# Patient Record
Sex: FEMALE | Race: WHITE | NOT HISPANIC OR LATINO | Employment: OTHER | URBAN - METROPOLITAN AREA
[De-identification: names, ages, dates, MRNs, and addresses within clinical notes are randomized per-mention and may not be internally consistent; named-entity substitution may affect disease eponyms.]

---

## 2017-01-19 ENCOUNTER — CONVERSION ENCOUNTER (OUTPATIENT)
Dept: MAMMOGRAPHY | Facility: HOSPITAL | Age: 62
End: 2017-01-19

## 2019-01-05 ENCOUNTER — HOSPITAL ENCOUNTER (OUTPATIENT)
Dept: URGENT CARE | Facility: CLINIC | Age: 64
Discharge: HOME | End: 2019-01-05

## 2019-02-15 ENCOUNTER — HOSPITAL ENCOUNTER (OUTPATIENT)
Dept: MRI IMAGING | Facility: HOSPITAL | Age: 64
Discharge: HOME | End: 2019-02-15
Attending: FAMILY MEDICINE

## 2019-02-15 LAB
CREAT BLD-MCNC: 0.7 MG/DL (ref 0.6–1.4)
GFR SERPLBLD BASED ON 1.73 SQ M-ARVRAT: >60 ML/MIN/{1.73_M2}

## 2019-02-16 ENCOUNTER — HOSPITAL ENCOUNTER (OUTPATIENT)
Dept: OTHER | Facility: HOSPITAL | Age: 64
Discharge: HOME | End: 2019-02-16
Attending: FAMILY MEDICINE

## 2019-02-16 LAB
CEA SERPL-MCNC: 1.6 NG/ML (ref 0–5)
CHOLEST SERPL-MCNC: 177 MG/DL (ref 107–200)
CHOLEST/HDLC SERPL: 2.5 {RATIO} (ref 3–6)
CONV HIV-1/ HIV-2: NEGATIVE
FOLATE SERPL-MCNC: >20 NG/ML (ref 4.8–20)
HDLC SERPL-MCNC: 72 MG/DL (ref 40–60)
LDLC SERPL CALC-MCNC: 84 MG/DL (ref 70–100)
T4 FREE SERPL-MCNC: 1.4 NG/DL (ref 0.9–1.8)
TRIGL SERPL-MCNC: 107 MG/DL (ref 40–150)
TSH SERPL-ACNC: 1.07 M[IU]/L (ref 0.27–4.2)
VIT B12 SERPL-MCNC: 570 PG/ML (ref 211–911)
VLDLC SERPL-MCNC: 21 MG/DL (ref 5–37)

## 2019-02-19 LAB
B BURGDOR IGG+IGM SER-ACNC: NORMAL
CANCER AG125 SERPL-ACNC: 7.6 U/ML (ref 0–38.1)

## 2019-02-20 LAB — CONV TREPONEMA PALLIDUM (RPR) WITH FTA-ABS, TP-PA REFLEXES: NON REACTIVE

## 2019-02-27 ENCOUNTER — HOSPITAL ENCOUNTER (OUTPATIENT)
Dept: CARDIOLOGY | Facility: HOSPITAL | Age: 64
Discharge: HOME | End: 2019-02-27
Attending: FAMILY MEDICINE

## 2019-03-08 ENCOUNTER — HOSPITAL ENCOUNTER (OUTPATIENT)
Dept: MAMMOGRAPHY | Facility: HOSPITAL | Age: 64
Discharge: HOME | End: 2019-03-08
Attending: FAMILY MEDICINE

## 2019-03-26 ENCOUNTER — HOSPITAL ENCOUNTER (OUTPATIENT)
Dept: URGENT CARE | Facility: CLINIC | Age: 64
Discharge: HOME | End: 2019-03-26
Attending: EMERGENCY MEDICINE

## 2019-04-27 ENCOUNTER — HOSPITAL ENCOUNTER (OUTPATIENT)
Dept: OTHER | Facility: HOSPITAL | Age: 64
Discharge: HOME | End: 2019-04-27
Attending: FAMILY MEDICINE

## 2019-04-27 LAB
ALBUMIN SERPL-MCNC: 4.2 G/DL (ref 3.5–5)
ALBUMIN/GLOB SERPL: 1.6 {RATIO} (ref 1.4–2.6)
ALP SERPL-CCNC: 84 U/L (ref 43–160)
ALT SERPL-CCNC: 18 U/L (ref 10–40)
ANION GAP SERPL CALC-SCNC: 12 MMOL/L (ref 8–19)
AST SERPL-CCNC: 21 U/L (ref 15–50)
BILIRUB SERPL-MCNC: 0.38 MG/DL (ref 0.2–1.3)
BUN SERPL-MCNC: 11 MG/DL (ref 5–25)
BUN/CREAT SERPL: 15 {RATIO} (ref 6–20)
CALCIUM SERPL-MCNC: 9.4 MG/DL (ref 8.7–10.4)
CHLORIDE SERPL-SCNC: 105 MMOL/L (ref 99–111)
CHOLEST SERPL-MCNC: 176 MG/DL (ref 107–200)
CHOLEST/HDLC SERPL: 2.3 {RATIO} (ref 3–6)
CONV CO2: 30 MMOL/L (ref 22–32)
CONV TOTAL PROTEIN: 6.8 G/DL (ref 6.3–8.2)
CREAT UR-MCNC: 0.74 MG/DL (ref 0.5–0.9)
GFR SERPLBLD BASED ON 1.73 SQ M-ARVRAT: >60 ML/MIN/{1.73_M2}
GLOBULIN UR ELPH-MCNC: 2.6 G/DL (ref 2–3.5)
GLUCOSE SERPL-MCNC: 98 MG/DL (ref 65–99)
HDLC SERPL-MCNC: 78 MG/DL (ref 40–60)
LDLC SERPL CALC-MCNC: 74 MG/DL (ref 70–100)
OSMOLALITY SERPL CALC.SUM OF ELEC: 295 MOSM/KG (ref 273–304)
POTASSIUM SERPL-SCNC: 4.4 MMOL/L (ref 3.5–5.3)
SODIUM SERPL-SCNC: 143 MMOL/L (ref 135–147)
TRIGL SERPL-MCNC: 122 MG/DL (ref 40–150)
VLDLC SERPL-MCNC: 24 MG/DL (ref 5–37)

## 2019-09-28 ENCOUNTER — HOSPITAL ENCOUNTER (OUTPATIENT)
Dept: OTHER | Facility: HOSPITAL | Age: 64
Discharge: HOME | End: 2019-09-28
Attending: FAMILY MEDICINE

## 2019-09-28 LAB
ALBUMIN SERPL-MCNC: 4.4 G/DL (ref 3.5–5)
ALBUMIN/GLOB SERPL: 1.8 {RATIO} (ref 1.4–2.6)
ALP SERPL-CCNC: 91 U/L (ref 43–160)
ALT SERPL-CCNC: 20 U/L (ref 10–40)
ANION GAP SERPL CALC-SCNC: 15 MMOL/L (ref 8–19)
AST SERPL-CCNC: 21 U/L (ref 15–50)
BILIRUB SERPL-MCNC: 0.23 MG/DL (ref 0.2–1.3)
BUN SERPL-MCNC: 13 MG/DL (ref 5–25)
BUN/CREAT SERPL: 18 {RATIO} (ref 6–20)
CALCIUM SERPL-MCNC: 9.4 MG/DL (ref 8.7–10.4)
CHLORIDE SERPL-SCNC: 106 MMOL/L (ref 99–111)
CONV CO2: 27 MMOL/L (ref 22–32)
CONV TOTAL PROTEIN: 6.8 G/DL (ref 6.3–8.2)
CREAT UR-MCNC: 0.72 MG/DL (ref 0.5–0.9)
GFR SERPLBLD BASED ON 1.73 SQ M-ARVRAT: >60 ML/MIN/{1.73_M2}
GLOBULIN UR ELPH-MCNC: 2.4 G/DL (ref 2–3.5)
GLUCOSE SERPL-MCNC: 100 MG/DL (ref 65–99)
OSMOLALITY SERPL CALC.SUM OF ELEC: 298 MOSM/KG (ref 273–304)
POTASSIUM SERPL-SCNC: 4.3 MMOL/L (ref 3.5–5.3)
SODIUM SERPL-SCNC: 144 MMOL/L (ref 135–147)

## 2019-12-30 ENCOUNTER — HOSPITAL ENCOUNTER (OUTPATIENT)
Dept: OTHER | Facility: HOSPITAL | Age: 64
Discharge: HOME | End: 2019-12-30
Attending: FAMILY MEDICINE

## 2019-12-30 LAB
ALBUMIN SERPL-MCNC: 4 G/DL (ref 3.5–5)
ALBUMIN/GLOB SERPL: 1.7 {RATIO} (ref 1.4–2.6)
ALP SERPL-CCNC: 79 U/L (ref 43–160)
ALT SERPL-CCNC: 16 U/L (ref 10–40)
ANION GAP SERPL CALC-SCNC: 14 MMOL/L (ref 8–19)
AST SERPL-CCNC: 19 U/L (ref 15–50)
BILIRUB SERPL-MCNC: 0.2 MG/DL (ref 0.2–1.3)
BUN SERPL-MCNC: 15 MG/DL (ref 5–25)
BUN/CREAT SERPL: 21 {RATIO} (ref 6–20)
CALCIUM SERPL-MCNC: 9.4 MG/DL (ref 8.7–10.4)
CHLORIDE SERPL-SCNC: 105 MMOL/L (ref 99–111)
CONV CO2: 28 MMOL/L (ref 22–32)
CONV TOTAL PROTEIN: 6.4 G/DL (ref 6.3–8.2)
CREAT UR-MCNC: 0.71 MG/DL (ref 0.5–0.9)
GFR SERPLBLD BASED ON 1.73 SQ M-ARVRAT: >60 ML/MIN/{1.73_M2}
GLOBULIN UR ELPH-MCNC: 2.4 G/DL (ref 2–3.5)
GLUCOSE SERPL-MCNC: 90 MG/DL (ref 65–99)
OSMOLALITY SERPL CALC.SUM OF ELEC: 296 MOSM/KG (ref 273–304)
POTASSIUM SERPL-SCNC: 4.3 MMOL/L (ref 3.5–5.3)
SODIUM SERPL-SCNC: 143 MMOL/L (ref 135–147)
T4 FREE SERPL-MCNC: 1.3 NG/DL (ref 0.9–1.8)
TSH SERPL-ACNC: 1.23 M[IU]/L (ref 0.27–4.2)

## 2020-02-06 ENCOUNTER — HOSPITAL ENCOUNTER (OUTPATIENT)
Dept: URGENT CARE | Facility: CLINIC | Age: 65
Discharge: HOME | End: 2020-02-06

## 2020-02-08 LAB — BACTERIA SPEC AEROBE CULT: NORMAL

## 2020-05-22 ENCOUNTER — HOSPITAL ENCOUNTER (OUTPATIENT)
Dept: LAB | Facility: HOSPITAL | Age: 65
Discharge: HOME | End: 2020-05-22
Attending: FAMILY MEDICINE

## 2020-05-22 LAB
ALBUMIN SERPL-MCNC: 4.5 G/DL (ref 3.5–5)
ALBUMIN/GLOB SERPL: 1.7 {RATIO} (ref 1.4–2.6)
ALP SERPL-CCNC: 85 U/L (ref 43–160)
ALT SERPL-CCNC: 21 U/L (ref 10–40)
ANION GAP SERPL CALC-SCNC: 16 MMOL/L (ref 8–19)
AST SERPL-CCNC: 22 U/L (ref 15–50)
BILIRUB SERPL-MCNC: 0.24 MG/DL (ref 0.2–1.3)
BUN SERPL-MCNC: 21 MG/DL (ref 5–25)
BUN/CREAT SERPL: 26 {RATIO} (ref 6–20)
CALCIUM SERPL-MCNC: 9.9 MG/DL (ref 8.7–10.4)
CHLORIDE SERPL-SCNC: 102 MMOL/L (ref 99–111)
CHOLEST SERPL-MCNC: 209 MG/DL (ref 107–200)
CHOLEST/HDLC SERPL: 3.2 {RATIO} (ref 3–6)
CONV CO2: 27 MMOL/L (ref 22–32)
CONV TOTAL PROTEIN: 7.1 G/DL (ref 6.3–8.2)
CREAT UR-MCNC: 0.8 MG/DL (ref 0.5–0.9)
GFR SERPLBLD BASED ON 1.73 SQ M-ARVRAT: >60 ML/MIN/{1.73_M2}
GLOBULIN UR ELPH-MCNC: 2.6 G/DL (ref 2–3.5)
GLUCOSE SERPL-MCNC: 94 MG/DL (ref 65–99)
HDLC SERPL-MCNC: 65 MG/DL (ref 40–60)
LDLC SERPL CALC-MCNC: 117 MG/DL (ref 70–100)
OSMOLALITY SERPL CALC.SUM OF ELEC: 295 MOSM/KG (ref 273–304)
POTASSIUM SERPL-SCNC: 4.2 MMOL/L (ref 3.5–5.3)
SODIUM SERPL-SCNC: 141 MMOL/L (ref 135–147)
TRIGL SERPL-MCNC: 135 MG/DL (ref 40–150)
VLDLC SERPL-MCNC: 27 MG/DL (ref 5–37)

## 2020-06-05 ENCOUNTER — OFFICE VISIT CONVERTED (OUTPATIENT)
Dept: ORTHOPEDIC SURGERY | Facility: CLINIC | Age: 65
End: 2020-06-05
Attending: ORTHOPAEDIC SURGERY

## 2020-06-20 ENCOUNTER — HOSPITAL ENCOUNTER (OUTPATIENT)
Dept: MRI IMAGING | Facility: HOSPITAL | Age: 65
Discharge: HOME | End: 2020-06-20
Attending: ORTHOPAEDIC SURGERY

## 2020-06-24 ENCOUNTER — OFFICE VISIT CONVERTED (OUTPATIENT)
Dept: ORTHOPEDIC SURGERY | Facility: CLINIC | Age: 65
End: 2020-06-24
Attending: ORTHOPAEDIC SURGERY

## 2020-06-26 ENCOUNTER — HOSPITAL ENCOUNTER (OUTPATIENT)
Dept: OTHER | Facility: HOSPITAL | Age: 65
Setting detail: RECURRING SERIES
Discharge: HOME | End: 2020-10-01
Attending: ORTHOPAEDIC SURGERY

## 2020-07-29 ENCOUNTER — OFFICE VISIT CONVERTED (OUTPATIENT)
Dept: ORTHOPEDIC SURGERY | Facility: CLINIC | Age: 65
End: 2020-07-29
Attending: PHYSICIAN ASSISTANT

## 2020-09-04 ENCOUNTER — HOSPITAL ENCOUNTER (OUTPATIENT)
Dept: URGENT CARE | Facility: CLINIC | Age: 65
Discharge: HOME | End: 2020-09-04

## 2020-09-19 ENCOUNTER — HOSPITAL ENCOUNTER (OUTPATIENT)
Dept: OTHER | Facility: HOSPITAL | Age: 65
Discharge: HOME | End: 2020-09-19
Attending: FAMILY MEDICINE

## 2020-09-19 LAB
ALBUMIN SERPL-MCNC: 4.4 G/DL (ref 3.5–5)
ALBUMIN/GLOB SERPL: 1.8 {RATIO} (ref 1.4–2.6)
ALP SERPL-CCNC: 82 U/L (ref 43–160)
ALT SERPL-CCNC: 18 U/L (ref 10–40)
ANION GAP SERPL CALC-SCNC: 13 MMOL/L (ref 8–19)
AST SERPL-CCNC: 23 U/L (ref 15–50)
BILIRUB SERPL-MCNC: 0.23 MG/DL (ref 0.2–1.3)
BUN SERPL-MCNC: 19 MG/DL (ref 5–25)
BUN/CREAT SERPL: 26 {RATIO} (ref 6–20)
CALCIUM SERPL-MCNC: 9.7 MG/DL (ref 8.7–10.4)
CHLORIDE SERPL-SCNC: 103 MMOL/L (ref 99–111)
CONV CO2: 30 MMOL/L (ref 22–32)
CONV TOTAL PROTEIN: 6.9 G/DL (ref 6.3–8.2)
CREAT UR-MCNC: 0.74 MG/DL (ref 0.5–0.9)
GFR SERPLBLD BASED ON 1.73 SQ M-ARVRAT: >60 ML/MIN/{1.73_M2}
GLOBULIN UR ELPH-MCNC: 2.5 G/DL (ref 2–3.5)
GLUCOSE SERPL-MCNC: 102 MG/DL (ref 65–99)
OSMOLALITY SERPL CALC.SUM OF ELEC: 296 MOSM/KG (ref 273–304)
POTASSIUM SERPL-SCNC: 4.1 MMOL/L (ref 3.5–5.3)
SODIUM SERPL-SCNC: 142 MMOL/L (ref 135–147)

## 2020-11-08 ENCOUNTER — HOSPITAL ENCOUNTER (OUTPATIENT)
Dept: URGENT CARE | Facility: CLINIC | Age: 65
Discharge: HOME | End: 2020-11-08

## 2020-11-10 LAB — BACTERIA UR CULT: NORMAL

## 2020-11-16 ENCOUNTER — OFFICE VISIT CONVERTED (OUTPATIENT)
Dept: GASTROENTEROLOGY | Facility: CLINIC | Age: 65
End: 2020-11-16
Attending: NURSE PRACTITIONER

## 2020-12-24 ENCOUNTER — HOSPITAL ENCOUNTER (OUTPATIENT)
Dept: PREADMISSION TESTING | Facility: HOSPITAL | Age: 65
Discharge: HOME | End: 2020-12-24
Attending: INTERNAL MEDICINE

## 2020-12-26 LAB — SARS-COV-2 RNA SPEC QL NAA+PROBE: NOT DETECTED

## 2020-12-30 ENCOUNTER — HOSPITAL ENCOUNTER (OUTPATIENT)
Dept: GASTROENTEROLOGY | Facility: HOSPITAL | Age: 65
Setting detail: HOSPITAL OUTPATIENT SURGERY
Discharge: HOME | End: 2020-12-30
Attending: INTERNAL MEDICINE

## 2021-01-16 ENCOUNTER — HOSPITAL ENCOUNTER (OUTPATIENT)
Dept: OTHER | Facility: HOSPITAL | Age: 66
Discharge: HOME | End: 2021-01-16
Attending: FAMILY MEDICINE

## 2021-01-16 LAB
ALBUMIN SERPL-MCNC: 4.3 G/DL (ref 3.5–5)
ALBUMIN/GLOB SERPL: 1.6 {RATIO} (ref 1.4–2.6)
ALP SERPL-CCNC: 90 U/L (ref 43–160)
ALT SERPL-CCNC: 17 U/L (ref 10–40)
ANION GAP SERPL CALC-SCNC: 13 MMOL/L (ref 8–19)
AST SERPL-CCNC: 20 U/L (ref 15–50)
BILIRUB SERPL-MCNC: 0.24 MG/DL (ref 0.2–1.3)
BUN SERPL-MCNC: 17 MG/DL (ref 5–25)
BUN/CREAT SERPL: 24 {RATIO} (ref 6–20)
CALCIUM SERPL-MCNC: 10 MG/DL (ref 8.7–10.4)
CHLORIDE SERPL-SCNC: 104 MMOL/L (ref 99–111)
CHOLEST SERPL-MCNC: 195 MG/DL (ref 107–200)
CHOLEST/HDLC SERPL: 2.7 {RATIO} (ref 3–6)
CONV CO2: 31 MMOL/L (ref 22–32)
CONV TOTAL PROTEIN: 7 G/DL (ref 6.3–8.2)
CREAT UR-MCNC: 0.7 MG/DL (ref 0.5–0.9)
GFR SERPLBLD BASED ON 1.73 SQ M-ARVRAT: >60 ML/MIN/{1.73_M2}
GLOBULIN UR ELPH-MCNC: 2.7 G/DL (ref 2–3.5)
GLUCOSE SERPL-MCNC: 97 MG/DL (ref 65–99)
HDLC SERPL-MCNC: 71 MG/DL (ref 40–60)
LDLC SERPL CALC-MCNC: 98 MG/DL (ref 70–100)
OSMOLALITY SERPL CALC.SUM OF ELEC: 297 MOSM/KG (ref 273–304)
POTASSIUM SERPL-SCNC: 4.5 MMOL/L (ref 3.5–5.3)
SODIUM SERPL-SCNC: 143 MMOL/L (ref 135–147)
T4 FREE SERPL-MCNC: 1.4 NG/DL (ref 0.9–1.8)
TRIGL SERPL-MCNC: 129 MG/DL (ref 40–150)
TSH SERPL-ACNC: 1.04 M[IU]/L (ref 0.27–4.2)
VLDLC SERPL-MCNC: 26 MG/DL (ref 5–37)

## 2021-03-01 ENCOUNTER — OFFICE VISIT CONVERTED (OUTPATIENT)
Dept: GASTROENTEROLOGY | Facility: CLINIC | Age: 66
End: 2021-03-01
Attending: NURSE PRACTITIONER

## 2021-05-10 NOTE — H&P
"   History and Physical      Patient Name: Mony Echeverria   Patient ID: [id number]   Sex: Female   YOB: 1955    Primary Care Provider: Dilma Carrington MD   Referring Provider: Dilma Carrington MD    Visit Date: November 16, 2020    Provider: RIAN Bruce   [facility]   Location Address: [address] [place] [address]  [id number]   Location Phone: [phone]          Chief Complaint  · \"Age over 50\"      History Of Present Illness  The patient is a 64 year old /White female, who presents on referral from Dilma Carrington MD, for gastroenterology evaluation of screening colonoscopy for her risk factors for colon cancer. Her risk factors include an age greater than 50 years.   There is no recent history of rectal bleeding. She has no pertinent additional complaints      She has no family history of colon cancer.  She denies rectal bleeding.  Colonoscopy 10/2015 by Dr. Bolden revealed a tubular adenoma removed from ascending colon and grade I internal hemorrhoids.    She complains of having acid reflux for several years.  It is well controlled with famotidine 20 mg BID.  She has had several episodes of dysphagia that occurs with solids and liquids.  There is nothing that precipitates the dysphagia.  Nothing but time helps with the dysphagia.  She does report having a lot of drainage.  She denies N/V and abdominal pain.       Past Medical History  Asthma; Hematuria, microscopic; HTN (hypertension); Hypertension; Hypothyroidism; Reflux; Renal calculus or stone; Seasonal allergies         Past Surgical History  Appendectomy; Breast biopsy, right breast; Cataract surgery; Cesarian Section; Cholecystectomy; Colon Surgery; Colonoscopy; EGD; Hysterectomy; Kidney Stone Surgery, Unspecified; Surgical Clips         Medication List  Allergy Serum; Asmanex Twisthaler inhalation 0.24 gram; aspirin 81 mg oral tablet,delayed release (/EC); Claritin 10 mg " "oral tablet; EpiPen 0.3 mg/0.3 mL (1:1,000) injection auto-injector; famotidine 20 mg oral tablet; levothyroxine 125 mcg oral tablet; lisinopril 10 mg oral tablet; multivitamin oral tablet; pravastatin 40 mg oral tablet; Singulair 10 mg oral tablet         Allergy List  * Other; Keflex; Latex; SULFA (SULFONAMIDES)       Allergies Reconciled  Family Medical History  Bipolar Disorder; Stroke; Heart Disease; Cancer, Unspecified; Diabetes, unspecified type; Hypertension; - No Family History of Colorectal Cancer; Diabetes Mellitus, Type II; Bladder Cancer         Social History  Alcohol (Current some day); Claustophobic (Unknown); lives with spouse; ; Recreational Drug Use (Never); Tobacco (Former); Working         Review of Systems  · Constitutional  o Denies  o : chills, fever  · Eyes  o Denies  o : blurred vision, changes in vision  · Cardiovascular  o Denies  o : chest pain  · Respiratory  o Denies  o : shortness of breath  · Gastrointestinal  o Denies  o : nausea, vomiting  · Genitourinary  o Denies  o : dysuria, blood in urine  · Integument  o Denies  o : rash  · Neurologic  o Denies  o : tingling or numbness  · Musculoskeletal  o Denies  o : joint pain  · Endocrine  o Denies  o : weight gain, weight loss  · Psychiatric  o Denies  o : anxiety, depression      Vitals  Date Time BP Position Site L\R Cuff Size HR RR TEMP (F) WT  HT  BMI kg/m2 BSA m2 O2 Sat FR L/min FiO2        11/16/2020 08:18 /72 Sitting    69 - R   181lbs 4oz 5'  7\" 28.39 1.97 99 %            Physical Examination  · Constitutional  o Appearance  o : Well-nourished, well developed, alert, in no acute distress, alert and oriented X 3.  · Eyes  o Vision  o :   § Visual Fields  § : eyes move symmetrical in all directions  o Sclerae  o : sclerae anicteric  o Pupils and Irises  o : pupils equal and symetrical  · Neck  o Inspection/Palpation  o : Trachea is midline, no adenopathy  o Thyroid  o : Thyroid is not " enlarged  · Respiratory  o Respiratory Effort  o : Breathing is unlabored.  o Inspection of Chest  o : normal appearance, no retractions  o Auscultation of Lungs  o : Chest is clear to auscultation bilaterally.  · Cardiovascular  o Heart  o :   § Auscultation of Heart  § : no murmurs, rubs, or gallops  · Gastrointestinal  o Abdominal Examination  o : Abdomen is soft, nontender to palpation, with normal active bowel sounds, no appreciable hepatosplenomegaly.  · Genitourinary  o Digital Rectal Examination  o : Deferred  · Skin and Subcutaneous Tissue  o General Inspection  o : Skin is without focal lesions. Skin turgor is normal.  · Psychiatric  o Mood and Affect  o : Mood and affect are appropriate to circumstances.          Assessment  · Colon Cancer Screening     V76.51/Z12.11  · Colon polyp     211.3/K63.5  · Dysphagia     787.20/R13.10  · Gastroesophageal reflux     530.81/K21.9      Plan  · Orders  o Consent for Colonoscopy Screening -Possible risk/complications, benefits, and alternatives to surgical or invasive procedure have been explained to patient and/or legal gaurdian. -Patient has been evaluated and can tolerate anethesia and/or sedation. Risk, benefits, and alternatives to anesthesia and sedation have been explained to patient or legal gaurdian. () - V76.51/Z12.11, 211.3/K63.5 - 11/16/2020  o Consent for Esophagogastroduodenoscopy (EGD) - Possible risks/complications, benefits, and alternatives to surgical or invasive procedure have been explained to patient and/or legal guardian. - Patient has been evaluated and can tolerate anesthesia and/or sedation. Risks, benefits, and alternatives to anesthesia and sedation have been explained to patient and/or legal guardian. ([id number]) - 787.20/R13.10, 530.81/K21.9 - 11/16/2020  · Medications  o Medications have been Reconciled  o Transition of Care or Provider Policy  · Instructions  o 64 year old female presenting today for a colorectal cancer screening. She  also has acid reflux and intermittent dysphagia. I have recommended that she undergo and EGD/colonoscopy. The patient has been notified that COVID testing is required prior to the procedure. The patient is agreeable to the plan.  o Electronically Identified Patient Education Materials Provided Electronically            Electronically Signed by: RIAN Bruce -Author on November 16, 2020 08:32:38 AM  Electronically Co-signed by: Rene Bolden MD -Reviewer on November 23, 2020 02:58:23 PM

## 2021-05-10 NOTE — H&P
History and Physical      Patient Name: Mony Echeverria   Patient ID: [id number]   Sex: Female   YOB: 1955    Primary Care Provider: Dilma Carrington MD   Referring Provider: Dilma Carrington MD    Visit Date: June 5, 2020    Provider: Sampson Moore MD   Location: [facility]   Location Address: [address] [place] [address]  [id number]   Location Phone: [phone]          Chief Complaint  · Right shoulder pain      History Of Present Illness  Mony Echeverria is a 64 year old /White female who presents today to [facility]. Patient is here for right shoulder injury sustained in March when she had a fall. Patient states pain and weakness in the right shoulder that radiates from the right shoulder down to her elbow. Patient states pain with range of motion. Patient denies numbness or tingling.       Past Medical History  Asthma; Hematuria, microscopic; HTN (hypertension); Hypertension; Hypothyroidism; Renal calculus or stone; Seasonal allergies; Thyroid disorder         Past Surgical History  Appendectomy; Breast biopsy, right breast; Cataract surgery; Cesarian Section; Cholecystectomy; Colon Surgery; Colonoscopy; EGD; Gallbladder; Hysterectomy; Kidney Stone Surgery, Unspecified; Surgical Clips         Medication List  Allergy Serum; Asmanex Twisthaler inhalation 0.24 gram; EpiPen 0.3 mg/0.3 mL (1:1,000) injection auto-injector; fluticasone 50 mcg/actuation nasal spray,suspension; lisinopril 10 mg oral tablet; multivitamin oral tablet; Pravachol 40 mg oral tablet; prednisone 20 mg oral tablet; Prilosec OTC 20 mg oral tablet,delayed release (DR/EC); Singulair 10 mg oral tablet; Suprep Bowel Prep Kit 17.5-3.13-1.6 gram oral recon soln; Synthroid 125 mcg oral tablet; Ventolin HFA 90 mcg/actuation inhalation HFA aerosol inhaler         Allergy List  * Other; Keflex; Latex; Latex Exam Gloves; SULFA (SULFONAMIDES)         Family Medical History  Bipolar Disorder; Stroke;  "Heart Disease; Cancer, Unspecified; Diabetes, unspecified type; Hypertension; - No Family History of Colorectal Cancer; Diabetes mellitus, type II; Bladder Cancer         Social History  Alcohol (Current some day); Alcohol Use (Current some day); Claustophobic (Unknown); lives with spouse; ; .; Recreational Drug Use (Never); Tobacco (Former); Working         Review of Systems  · Constitutional  o Denies  o : fever, chills, weight loss  · Cardiovascular  o Denies  o : chest pain, shortness of breath  · Gastrointestinal  o Denies  o : liver disease, heartburn, nausea, blood in stools  · Genitourinary  o Denies  o : painful urination, blood in urine  · Integument  o Denies  o : rash, itching  · Neurologic  o Denies  o : headache, weakness, loss of consciousness  · Musculoskeletal  o Denies  o : painful, swollen joints  · Psychiatric  o Denies  o : drug/alcohol addiction, anxiety, depression      Vitals  Date Time BP Position Site L\R Cuff Size HR RR TEMP (F) WT  HT  BMI kg/m2 BSA m2 O2 Sat        06/05/2020 02:34 PM         173lbs 0oz 5'  7\" 27.1 1.93           Physical Examination  · Constitutional  o Appearance  o : well developed, well-nourished, no obvious deformities present  · Head and Face  o Head  o :   § Inspection  § : normocephalic  o Face  o :   § Inspection  § : no facial lesions  · Eyes  o Conjunctivae  o : conjunctivae normal  o Sclerae  o : sclerae white  · Ears, Nose, Mouth and Throat  o Ears  o :   § External Ears  § : appearance within normal limits  § Hearing  § : intact  o Nose  o :   § External Nose  § : appearance normal  · Neck  o Inspection/Palpation  o : normal appearance  o Range of Motion  o : full range of motion  · Respiratory  o Respiratory Effort  o : breathing unlabored  o Inspection of Chest  o : normal appearance  o Auscultation of Lungs  o : no audible wheezing or rales  · Cardiovascular  o Heart  o : regular rate  · Gastrointestinal  o Abdominal Examination  o : soft " and non-tender  · Skin and Subcutaneous Tissue  o General Inspection  o : intact, no rashes  · Psychiatric  o General  o : Alert and oriented x3  o Judgement and Insight  o : judgment and insight intact  o Mood and Affect  o : mood normal, affect appropriate  · Right Shoulder  o Inspection  o : No skin discoloration, atrophy or swelling. Palpable tenderness over the AC joint. Palpable tenderness over greater tuberosity. She has passive range of motion to 130. Abduction to 90. Internal rotation to L4. Positive empty can. Positive Neers. Positive Salazar. X-rays show superior rising humeral head. AC joint osteoarthritis. Negative for fracture or dislocation.   · In Office Procedures  o View  o : AP/LATERAL  o Site  o : right shoulder  o Indication  o : right shoulder pain  o Study  o : X-rays ordered, taken in the office, and reviewed today.  o Xray  o : X-rays show superior rising humeral head. AC joint osteoarthritis. Negative for fracture or dislocation.   o Comparative Data  o : No comparative data found              Assessment  · Right shoulder pain, unspecified chronicity     719.41/M25.511  · Right shoulder injury     959.2/S49.90XA      Plan  · Orders  o [place] (Right) [facility] ([id number]) - 719.41/M25.511 - 06/05/2020  · Medications  o Medications have been Reconciled  o Transition of Care or Provider Policy  · Instructions  o Reviewed the patient's Past Medical, Social, and Family history as well as the ROS at today's visit, no changes.  o Call or return if worsening symptoms.  o This note is transcribed by Krysta petty/gifty  o Right shoulder MRI. Follow-up after the MRI.             Electronically Signed by: Krytsa Hou, -Author on June 8, 2020 11:44:00 AM  Electronically Co-signed by: Kathya Moe PA-C -Reviewer on June 8, 2020 12:44:06 PM  Electronically Co-signed by: Sampson Moore MD -Reviewer on Melinda 10, 2020 11:19:52 AM

## 2021-05-13 NOTE — PROGRESS NOTES
Progress Note      Patient Name: Mony Echeverria   Patient ID: [id number]   Sex: Female   YOB: 1955    Primary Care Provider: Dilma Carrington MD   Referring Provider: Dilma Carrington MD    Visit Date: June 24, 2020    Provider: Sampson Moore MD   Location: [facility]   Location Address: [address] [place] [address]  [id number]   Location Phone: [phone]          Chief Complaint  · Right Shoulder Pain - MRI Results      History Of Present Illness  Mony Echeverria is a 64 year old /White female who presents today to [facility].      Patient reports a fall in March landing directly on the shoulder and has been having radiating pain since. She reports the pain is from the shoulder to the elbow. She was seen recently with an MRI with [facility]. Patient reports her pain is some better and the shoulder movement has improved some as well.       Past Medical History  Asthma; Hematuria, microscopic; HTN (hypertension); Hypertension; Hypothyroidism; Renal calculus or stone; Seasonal allergies; Thyroid disorder         Past Surgical History  Appendectomy; Breast biopsy, right breast; Cataract surgery; Cesarian Section; Cholecystectomy; Colon Surgery; Colonoscopy; EGD; Gallbladder; Hysterectomy; Kidney Stone Surgery, Unspecified; Surgical Clips         Medication List  Allergy Serum; Asmanex Twisthaler inhalation 0.24 gram; EpiPen 0.3 mg/0.3 mL (1:1,000) injection auto-injector; fluticasone 50 mcg/actuation nasal spray,suspension; lisinopril 10 mg oral tablet; multivitamin oral tablet; Pravachol 40 mg oral tablet; prednisone 20 mg oral tablet; Prilosec OTC 20 mg oral tablet,delayed release (DR/EC); Singulair 10 mg oral tablet; Suprep Bowel Prep Kit 17.5-3.13-1.6 gram oral recon soln; Synthroid 125 mcg oral tablet; Ventolin HFA 90 mcg/actuation inhalation HFA aerosol inhaler         Allergy List  * Other; Keflex; Latex; Latex Exam Gloves; SULFA (SULFONAMIDES)         Family  "Medical History  Bipolar Disorder; Stroke; Heart Disease; Cancer, Unspecified; Diabetes, unspecified type; Hypertension; - No Family History of Colorectal Cancer; Diabetes Mellitus, Type II; Bladder Cancer         Social History  Alcohol (Current some day); Alcohol Use (Current some day); Claustophobic (Unknown); lives with spouse; ; .; Recreational Drug Use (Never); Tobacco (Former); Working         Review of Systems  · Constitutional  o Denies  o : fever, chills, weight loss  · Cardiovascular  o Denies  o : chest pain, shortness of breath  · Gastrointestinal  o Denies  o : liver disease, heartburn, nausea, blood in stools  · Genitourinary  o Denies  o : painful urination, blood in urine  · Integument  o Denies  o : rash, itching  · Neurologic  o Denies  o : headache, weakness, loss of consciousness  · Musculoskeletal  o Denies  o : painful, swollen joints  · Psychiatric  o Denies  o : drug/alcohol addiction, anxiety, depression      Vitals  Date Time BP Position Site L\R Cuff Size HR RR TEMP (F) WT  HT  BMI kg/m2 BSA m2 O2 Sat        06/24/2020 09:20 AM      73 - R   174lbs 16oz 5'  7\" 27.41 1.94 98 %          Physical Examination  · Constitutional  o Appearance  o : well developed, well-nourished, no obvious deformities present  · Head and Face  o Head  o :   § Inspection  § : normocephalic  o Face  o :   § Inspection  § : no facial lesions  · Eyes  o Conjunctivae  o : conjunctivae normal  o Sclerae  o : sclerae white  · Ears, Nose, Mouth and Throat  o Ears  o :   § External Ears  § : appearance within normal limits  § Hearing  § : intact  o Nose  o :   § External Nose  § : appearance normal  · Neck  o Inspection/Palpation  o : normal appearance  o Range of Motion  o : full range of motion  · Respiratory  o Respiratory Effort  o : breathing unlabored  o Inspection of Chest  o : normal appearance  o Auscultation of Lungs  o : no audible wheezing or rales  · Cardiovascular  o Heart  o : regular " rate  · Gastrointestinal  o Abdominal Examination  o : soft and non-tender  · Skin and Subcutaneous Tissue  o General Inspection  o : intact, no rashes  · Psychiatric  o General  o : Alert and oriented x3  o Judgement and Insight  o : judgment and insight intact  o Mood and Affect  o : mood normal, affect appropriate  · Right Shoulder  o Inspection  o : 80 degrees forward flexion, No skin discoloration, atrophy or swelling. Palpable tenderness over the AC joint. Palpable tenderness over greater tuberosity. Abduction to 90. Internal rotation to L4. Positive empty can. Positive Neers. Positive Salazar. Good tone of deltoid, biceps, triceps, wrist extensors, and wrist flexors  · Injection Note/Aspiration Note  o Site  o : right shoulder  o Procedure  o : Procedure: After educating the patient, patient gave consent for procedure. After using Chloraprep, the joint space was injected. The patient tolerated the procedure well.   o Medication  o : 80 mg of DepoMedrol with 9cc of 1% Lidocaine  · Imaging  o Imaging  o : MRI 6.20.20 with [facility]: tearing of the anterior inferior glenohumeral shoulder joint capsule ligament. Stress fracture verses focal red marrow surrounded by fatty marrow in the proximal shaft-metaphysis right humerus. Partial tearing and Tendinopathy in the critical zone and insertion of the supraspinatus tendon of the right rotator cuff          Assessment  · Right shoulder pain, unspecified chronicity     719.41/M25.511  · Adhesive capsulitis     726.0/M75.00      Plan  · Orders  o Depo-Medrol injection 80mg () - - 06/24/2020   Lot [id number] Exp 06 2021 [employer] Administered by AMY Moore MD  o Shoulder Intra-articular Injection without US Guidance [facility] ([id number]) - - 06/24/2020   Lot [id number] Exp 07 01 2021 [employer] Administered by AMY Moore MD  · Medications  o Medications have been Reconciled  o Transition of Care or Provider Policy  · Instructions  o Reviewed the patient's Past Medical, Social, and  Family history as well as the ROS at today's visit, no changes.  o Call or return if worsening symptoms.  o Follow up in 6 weeks.  o The above service was scribed by Aster Lamb on my behalf and I attest to the accuracy of the note. gifty  o Discussed diagnosis and treatment options. We reviewed her MRI which revealed no RTC tear and discussed from the fall and protecting the shoulder she now has adhesive capsulitis. We discussed if she cannot gain full ROM we may consider a shoulder manipulation. We recommended a steroid injection and physical therapy. She expressed understanding and wished to proceed with an injection and therapy.             Electronically Signed by: Aster Lamb-, Other -Author on June 26, 2020 09:56:56 PM  Electronically Co-signed by: Sampson Moore MD -Reviewer on June 29, 2020 09:33:52 AM

## 2021-05-13 NOTE — PROGRESS NOTES
Progress Note      Patient Name: Mony Echeverria   Patient ID: [id number]   Sex: Female   YOB: 1955    Primary Care Provider: Dilma Carrington MD   Referring Provider: Dilma Carrington MD    Visit Date: July 29, 2020    Provider: Denisa Ayala PA-C   Location: [facility]   Location Address: [address] [place] [address]  [id number]   Location Phone: [phone]          Chief Complaint  · Right shoulder pain       History Of Present Illness  Mony Echeverria is a 64 year old /White female who presents today to [facility].      She is here for follow up for right adhesive capsulitis. She had injection last visit and is attending PT. She states great improvement in her motion, but continues to have pain, for which she takes Aleve.       Past Medical History  Asthma; Hematuria, microscopic; HTN (hypertension); Hypertension; Hypothyroidism; Renal calculus or stone; Seasonal allergies; Thyroid disorder         Past Surgical History  Appendectomy; Breast biopsy, right breast; Cataract surgery; Cesarian Section; Cholecystectomy; Colon Surgery; Colonoscopy; EGD; Gallbladder; Hysterectomy; Kidney Stone Surgery, Unspecified; Surgical Clips         Medication List  Allergy Serum; Asmanex Twisthaler inhalation 0.24 gram; EpiPen 0.3 mg/0.3 mL (1:1,000) injection auto-injector; fluticasone 50 mcg/actuation nasal spray,suspension; lisinopril 10 mg oral tablet; multivitamin oral tablet; Pravachol 40 mg oral tablet; prednisone 20 mg oral tablet; Prilosec OTC 20 mg oral tablet,delayed release (DR/EC); Singulair 10 mg oral tablet; Suprep Bowel Prep Kit 17.5-3.13-1.6 gram oral recon soln; Synthroid 125 mcg oral tablet; Ventolin HFA 90 mcg/actuation inhalation HFA aerosol inhaler         Allergy List  * Other; Keflex; Latex; Latex Exam Gloves; SULFA (SULFONAMIDES)       Allergies Reconciled  Family Medical History  Bipolar Disorder; Stroke; Heart Disease; Cancer, Unspecified; Diabetes,  "unspecified type; Hypertension; - No Family History of Colorectal Cancer; Diabetes Mellitus, Type II; Bladder Cancer         Social History  Alcohol (Current some day); Alcohol Use (Current some day); Claustophobic (Unknown); lives with spouse; ; .; Recreational Drug Use (Never); Tobacco (Former); Working         Review of Systems  · Constitutional  o Denies  o : fever, chills, weight loss  · Cardiovascular  o Denies  o : chest pain, shortness of breath  · Gastrointestinal  o Denies  o : liver disease, heartburn, nausea, blood in stools  · Genitourinary  o Denies  o : painful urination, blood in urine  · Integument  o Denies  o : rash, itching  · Neurologic  o Denies  o : headache, weakness, loss of consciousness  · Musculoskeletal  o Admits  o : painful, swollen joints  · Psychiatric  o Denies  o : drug/alcohol addiction, anxiety, depression      Vitals  Date Time BP Position Site L\R Cuff Size HR RR TEMP (F) WT  HT  BMI kg/m2 BSA m2 O2 Sat        07/29/2020 08:24 AM      78 - R   181lbs 0oz 5'  7\" 28.35 1.97 98 %          Physical Examination  · Constitutional  o Appearance  o : well developed, well-nourished, no obvious deformities present  · Head and Face  o Head  o :   § Inspection  § : normocephalic  o Face  o :   § Inspection  § : no facial lesions  · Eyes  o Conjunctivae  o : conjunctivae normal  o Sclerae  o : sclerae white  · Ears, Nose, Mouth and Throat  o Ears  o :   § External Ears  § : appearance within normal limits  § Hearing  § : intact  o Nose  o :   § External Nose  § : appearance normal  · Neck  o Inspection/Palpation  o : normal appearance  o Range of Motion  o : full range of motion  · Respiratory  o Respiratory Effort  o : breathing unlabored  o Inspection of Chest  o : normal appearance  o Auscultation of Lungs  o : no audible wheezing or rales  · Cardiovascular  o Heart  o : regular rate  · Gastrointestinal  o Abdominal Examination  o : soft and non-tender  · Skin and " Subcutaneous Tissue  o General Inspection  o : intact, no rashes  · Psychiatric  o General  o : Alert and oriented x3  o Judgement and Insight  o : judgment and insight intact  o Mood and Affect  o : mood normal, affect appropriate  · Right Shoulder  o Inspection  o : 150 degrees forward flexion, 120 degrees abduction. ER 60 degrees. IR to L5. No skin discoloration, atrophy or swelling. Palpable tenderness over the AC joint. Palpable tenderness over greater tuberosity. Good tone of deltoid, biceps, triceps, wrist extensors, and wrist flexors   · Imaging  o Imaging  o : MRI 6.20.20 with [facility]: tearing of the anterior inferior glenohumeral shoulder joint capsule ligament. Stress fracture verses focal red marrow surrounded by fatty marrow in the proximal shaft-metaphysis right humerus. Partial tearing and Tendinopathy in the critical zone and insertion of the supraspinatus tendon of the right rotator cuff           Assessment  · Right shoulder pain, unspecified chronicity     719.41/M25.511  · Adhesive capsulitis     726.0/M75.00      Plan  · Medications  o Medications have been Reconciled  o Transition of Care or Provider Policy  · Instructions  o Reviewed the patient's Past Medical, Social, and Family history as well as the ROS at today's visit, no changes.  o Call or return if worsening symptoms.  o I believe she has avoided a manipulation. She will continue PT and try to wean off Aleve. Follow up PRN.  o Electronically Identified Patient Education Materials Provided Electronically            Electronically Signed by: EMILIO López-C -Author on July 29, 2020 08:40:14 AM  Electronically Co-signed by: Sampson Moore MD -Reviewer on July 29, 2020 08:55:19 AM

## 2021-05-14 VITALS
DIASTOLIC BLOOD PRESSURE: 73 MMHG | BODY MASS INDEX: 28.25 KG/M2 | RESPIRATION RATE: 16 BRPM | WEIGHT: 180 LBS | HEIGHT: 67 IN | SYSTOLIC BLOOD PRESSURE: 138 MMHG | OXYGEN SATURATION: 99 % | HEART RATE: 70 BPM

## 2021-05-14 VITALS
OXYGEN SATURATION: 99 % | HEART RATE: 69 BPM | SYSTOLIC BLOOD PRESSURE: 135 MMHG | HEIGHT: 67 IN | BODY MASS INDEX: 28.45 KG/M2 | DIASTOLIC BLOOD PRESSURE: 72 MMHG | WEIGHT: 181.25 LBS

## 2021-05-14 NOTE — PROGRESS NOTES
Progress Note      Patient Name: Mony Echeverria   Patient ID: [id number]   Sex: Female   YOB: 1955    Primary Care Provider: Dilma Carrington MD   Referring Provider: Dilma Carrington MD    Visit Date: March 1, 2021    Provider: RIAN LUQUE   Location: [facility]   Location Address: [address] [place] [address]  [id number]   Location Phone: [phone]          Chief Complaint  · Follow up of EGD/Colonoscopy      History Of Present Illness     65-year-old female with a history of reflux and dysphagia.  Presents to the office today for a follow-up after undergoing EGD and colonoscopy.  Patient had a balloon dilation up to 18 mm during EGD back in December.  Patient reports that her dysphagia has completely resolved since dilation.  Patient is having no issues swallowing food, solids, liquids, pills.  Patient reports her reflux is very well controlled on Pepcid twice a day 20 mg.  Denies nausea vomiting bloody stools hematic emesis, weight loss.  Patient reports she has no concerns and just needs a refill on her famotidine.    EGD/colonoscopy: Review of the patient's most recent EGD and colonoscopy performed by Dr. Bolden 12/30/2020 revealed a medium size hiatal hernia and a benign intrinsic stricture in the GE junction with balloon dilation up to 18 mm.  Grade 1 esophagitis in the GE junction.  Normal mucosa in the stomach.  Normal mucosa was noted in the colon and grade 1 internal hemorrhoids.  Pathology reports normal.    Labs: Reviewed the patient's most recent labs 1/16/2021 normal CMP         Past Medical History  Asthma; Hematuria, microscopic; HTN (hypertension); Hypertension; Hypothyroidism; Reflux; Renal calculus or stone; Seasonal allergies         Past Surgical History  Appendectomy; Breast biopsy, right breast; Cataract surgery; Cesarian Section; Cholecystectomy; Colon Surgery; Colonoscopy; EGD; Hysterectomy; Kidney Stone Surgery, Unspecified;  "Surgical Clips         Medication List  Allergy Serum; Asmanex Twisthaler inhalation 0.24 gram; aspirin 81 mg oral tablet,delayed release (DR/EC); Claritin 10 mg oral tablet; EpiPen 0.3 mg/0.3 mL (1:1,000) injection auto-injector; famotidine 20 mg oral tablet; levothyroxine 125 mcg oral tablet; lisinopril 10 mg oral tablet; multivitamin oral tablet; pravastatin 40 mg oral tablet; Singulair 10 mg oral tablet         Allergy List  * Other; Keflex; Latex; SULFA (SULFONAMIDES)         Family Medical History  Bipolar Disorder; Stroke; Heart Disease; Cancer, Unspecified; Diabetes, unspecified type; Hypertension; - No Family History of Colorectal Cancer; Diabetes Mellitus, Type II; Bladder Cancer         Social History  Alcohol (Current some day); Claustophobic (Unknown); lives with spouse; ; Recreational Drug Use (Never); Tobacco (Former); Working         Review of Systems  · Constitutional  o Denies  o : chills, fatigue, fever, malaise, night sweats, weight gain, weight loss  · Cardiovascular  o Denies  o : chest pain  · Respiratory  o Denies  o : shortness of breath  · Gastrointestinal  o Denies  o : abdominal pain, blood in stools, bowel changes, constipation, diarrhea, early satiety, heartburn, nausea, reflux, vomiting, dysphagia  · Endocrine  o Denies  o : weight gain, weight loss      Vitals  Date Time BP Position Site L\R Cuff Size HR RR TEMP (F) WT  HT  BMI kg/m2 BSA m2 O2 Sat FR L/min FiO2        03/01/2021 09:27 /73 Sitting    70 - R 16  180lbs 0oz 5'  7\" 28.19 1.96 99 %            Physical Examination  · Constitutional  o Appearance  o : Healthy-appearing, awake and alert in no acute distress, overweight  · Head and Face  o Head  o : Normocephalic with no worriesome skin lesions  · Eyes  o Vision  o :   § Visual Fields  § : eyes move symmetrical in all directions  o Sclerae  o : sclerae anicteric  · Neck  o Inspection/Palpation  o : Trachea is midline, no adenopathy  · Respiratory  o Respiratory " Effort  o : Breathing is unlabored.  o Inspection of Chest  o : normal appearance  o Auscultation of Lungs  o : Chest is clear to auscultation bilaterally.  · Cardiovascular  o Heart  o :   § Auscultation of Heart  § : no murmurs, rubs, or gallops, regular rate and rhythm  o Peripheral Vascular System  o :   § Extremities  § : no cyanosis, clubbing or edema;   · Gastrointestinal  o Abdominal Examination  o : Abdomen is soft, nontender to palpation, with normal active bowel sounds, no appreciable hepatosplenomegaly.          Assessment  · Dysphagia     787.20/R13.10  · Gastroesophageal Reflux     530.81/K21.9      Plan  · Medications  o famotidine 20 mg oral tablet   SIG: take 1 tablet (20 mg) by oral route every 12 hours   DISP: (60) Tablet with 11 refills  Prescribed on 03/01/2021     o Medications have been Reconciled  o Transition of Care or Provider Policy  · Instructions  o 65-year-old female with a history of reflux and dysphagia presents the office for follow-up after undergoing EGD and colonoscopy. Patient had balloon dilation during her EGD and complete resolution of her dysphagia. Patient's colonoscopy looked great and was reviewed with the patient. She will have a 5-year colonoscopy and EGD due to prep and esophagitis. I will refill the patient's Pepcid 20 mg twice daily. We will follow up on an as-needed basis. Patient is doing really well. Patient to call with any questions or concerns.  · Disposition  o Call or Return if symptoms worsen or persist.  o Meds sent to pharmacy            Electronically Signed by: RIAN LUQUE -Author on March 1, 2021 09:42:00 AM  Electronically Co-signed by: Rene Bolden MD -Reviewer on March 14, 2021 07:52:20 PM

## 2021-05-15 VITALS — WEIGHT: 173 LBS | BODY MASS INDEX: 27.15 KG/M2 | HEIGHT: 67 IN

## 2021-05-15 VITALS — OXYGEN SATURATION: 98 % | HEIGHT: 67 IN | BODY MASS INDEX: 27.47 KG/M2 | WEIGHT: 175 LBS | HEART RATE: 73 BPM

## 2021-05-15 VITALS — WEIGHT: 181 LBS | HEIGHT: 67 IN | BODY MASS INDEX: 28.41 KG/M2 | OXYGEN SATURATION: 98 % | HEART RATE: 78 BPM

## 2021-06-15 ENCOUNTER — TRANSCRIBE ORDERS (OUTPATIENT)
Dept: ADMINISTRATIVE | Facility: HOSPITAL | Age: 66
End: 2021-06-15

## 2021-06-15 ENCOUNTER — LAB (OUTPATIENT)
Dept: LAB | Facility: HOSPITAL | Age: 66
End: 2021-06-15

## 2021-06-15 DIAGNOSIS — E78.5 HYPERLIPIDEMIA, UNSPECIFIED HYPERLIPIDEMIA TYPE: Primary | ICD-10-CM

## 2021-06-15 DIAGNOSIS — E78.5 HYPERLIPIDEMIA, UNSPECIFIED HYPERLIPIDEMIA TYPE: ICD-10-CM

## 2021-06-15 DIAGNOSIS — Z12.31 VISIT FOR SCREENING MAMMOGRAM: Primary | ICD-10-CM

## 2021-06-15 LAB
ALBUMIN SERPL-MCNC: 4.4 G/DL (ref 3.5–5.2)
ALBUMIN/GLOB SERPL: 1.7 G/DL
ALP SERPL-CCNC: 92 U/L (ref 39–117)
ALT SERPL W P-5'-P-CCNC: 20 U/L (ref 1–33)
ANION GAP SERPL CALCULATED.3IONS-SCNC: 6.9 MMOL/L (ref 5–15)
AST SERPL-CCNC: 21 U/L (ref 1–32)
BILIRUB SERPL-MCNC: <0.2 MG/DL (ref 0–1.2)
BUN SERPL-MCNC: 16 MG/DL (ref 8–23)
BUN/CREAT SERPL: 20.3 (ref 7–25)
CALCIUM SPEC-SCNC: 9.4 MG/DL (ref 8.6–10.5)
CHLORIDE SERPL-SCNC: 101 MMOL/L (ref 98–107)
CO2 SERPL-SCNC: 30.1 MMOL/L (ref 22–29)
CREAT SERPL-MCNC: 0.79 MG/DL (ref 0.57–1)
GFR SERPL CREATININE-BSD FRML MDRD: 73 ML/MIN/1.73
GLOBULIN UR ELPH-MCNC: 2.6 GM/DL
GLUCOSE SERPL-MCNC: 89 MG/DL (ref 65–99)
POTASSIUM SERPL-SCNC: 4.4 MMOL/L (ref 3.5–5.2)
PROT SERPL-MCNC: 7 G/DL (ref 6–8.5)
SODIUM SERPL-SCNC: 138 MMOL/L (ref 136–145)

## 2021-06-15 PROCEDURE — 36415 COLL VENOUS BLD VENIPUNCTURE: CPT

## 2021-06-15 PROCEDURE — 80053 COMPREHEN METABOLIC PANEL: CPT

## 2021-06-19 ENCOUNTER — APPOINTMENT (OUTPATIENT)
Dept: CT IMAGING | Facility: HOSPITAL | Age: 66
End: 2021-06-19

## 2021-06-19 ENCOUNTER — HOSPITAL ENCOUNTER (EMERGENCY)
Facility: HOSPITAL | Age: 66
Discharge: HOME | End: 2021-06-19
Attending: EMERGENCY MEDICINE | Admitting: EMERGENCY MEDICINE

## 2021-06-19 VITALS
TEMPERATURE: 97.9 F | HEART RATE: 61 BPM | BODY MASS INDEX: 28.55 KG/M2 | HEIGHT: 67 IN | OXYGEN SATURATION: 94 % | SYSTOLIC BLOOD PRESSURE: 129 MMHG | WEIGHT: 181.88 LBS | DIASTOLIC BLOOD PRESSURE: 67 MMHG | RESPIRATION RATE: 16 BRPM

## 2021-06-19 DIAGNOSIS — R10.9 FLANK PAIN: Primary | ICD-10-CM

## 2021-06-19 LAB
ALBUMIN SERPL-MCNC: 4.4 G/DL (ref 3.5–5.2)
ALBUMIN/GLOB SERPL: 2 G/DL
ALP SERPL-CCNC: 86 U/L (ref 39–117)
ALT SERPL W P-5'-P-CCNC: 19 U/L (ref 1–33)
ANION GAP SERPL CALCULATED.3IONS-SCNC: 7.8 MMOL/L (ref 5–15)
AST SERPL-CCNC: 21 U/L (ref 1–32)
BACTERIA UR QL AUTO: ABNORMAL /HPF
BASOPHILS # BLD AUTO: 0.03 10*3/MM3 (ref 0–0.2)
BASOPHILS NFR BLD AUTO: 0.6 % (ref 0–1.5)
BILIRUB SERPL-MCNC: 0.3 MG/DL (ref 0–1.2)
BILIRUB UR QL STRIP: NEGATIVE
BUN SERPL-MCNC: 14 MG/DL (ref 8–23)
BUN/CREAT SERPL: 19.2 (ref 7–25)
CALCIUM SPEC-SCNC: 9.3 MG/DL (ref 8.6–10.5)
CHLORIDE SERPL-SCNC: 107 MMOL/L (ref 98–107)
CLARITY UR: CLEAR
CO2 SERPL-SCNC: 27.2 MMOL/L (ref 22–29)
COLOR UR: YELLOW
CREAT SERPL-MCNC: 0.73 MG/DL (ref 0.57–1)
DEPRECATED RDW RBC AUTO: 41.9 FL (ref 37–54)
EOSINOPHIL # BLD AUTO: 0.12 10*3/MM3 (ref 0–0.4)
EOSINOPHIL NFR BLD AUTO: 2.3 % (ref 0.3–6.2)
ERYTHROCYTE [DISTWIDTH] IN BLOOD BY AUTOMATED COUNT: 13 % (ref 12.3–15.4)
GFR SERPL CREATININE-BSD FRML MDRD: 80 ML/MIN/1.73
GLOBULIN UR ELPH-MCNC: 2.2 GM/DL
GLUCOSE SERPL-MCNC: 96 MG/DL (ref 65–99)
GLUCOSE UR STRIP-MCNC: NEGATIVE MG/DL
HCT VFR BLD AUTO: 38 % (ref 34–46.6)
HGB BLD-MCNC: 12.8 G/DL (ref 12–15.9)
HGB UR QL STRIP.AUTO: ABNORMAL
HOLD SPECIMEN: NORMAL
HOLD SPECIMEN: NORMAL
HYALINE CASTS UR QL AUTO: ABNORMAL /LPF
IMM GRANULOCYTES # BLD AUTO: 0.01 10*3/MM3 (ref 0–0.05)
IMM GRANULOCYTES NFR BLD AUTO: 0.2 % (ref 0–0.5)
KETONES UR QL STRIP: NEGATIVE
LEUKOCYTE ESTERASE UR QL STRIP.AUTO: ABNORMAL
LIPASE SERPL-CCNC: 46 U/L (ref 13–60)
LYMPHOCYTES # BLD AUTO: 1.61 10*3/MM3 (ref 0.7–3.1)
LYMPHOCYTES NFR BLD AUTO: 31.3 % (ref 19.6–45.3)
MCH RBC QN AUTO: 29.5 PG (ref 26.6–33)
MCHC RBC AUTO-ENTMCNC: 33.7 G/DL (ref 31.5–35.7)
MCV RBC AUTO: 87.6 FL (ref 79–97)
MONOCYTES # BLD AUTO: 0.41 10*3/MM3 (ref 0.1–0.9)
MONOCYTES NFR BLD AUTO: 8 % (ref 5–12)
NEUTROPHILS NFR BLD AUTO: 2.97 10*3/MM3 (ref 1.7–7)
NEUTROPHILS NFR BLD AUTO: 57.6 % (ref 42.7–76)
NITRITE UR QL STRIP: NEGATIVE
NRBC BLD AUTO-RTO: 0 /100 WBC (ref 0–0.2)
PH UR STRIP.AUTO: 6 [PH] (ref 5–8)
PLATELET # BLD AUTO: 268 10*3/MM3 (ref 140–450)
PMV BLD AUTO: 9.4 FL (ref 6–12)
POTASSIUM SERPL-SCNC: 4 MMOL/L (ref 3.5–5.2)
PROT SERPL-MCNC: 6.6 G/DL (ref 6–8.5)
PROT UR QL STRIP: NEGATIVE
RBC # BLD AUTO: 4.34 10*6/MM3 (ref 3.77–5.28)
RBC # UR: ABNORMAL /HPF
REF LAB TEST METHOD: ABNORMAL
SODIUM SERPL-SCNC: 142 MMOL/L (ref 136–145)
SP GR UR STRIP: 1.01 (ref 1–1.03)
SQUAMOUS #/AREA URNS HPF: ABNORMAL /HPF
UROBILINOGEN UR QL STRIP: ABNORMAL
WBC # BLD AUTO: 5.15 10*3/MM3 (ref 3.4–10.8)
WBC UR QL AUTO: ABNORMAL /HPF
WHOLE BLOOD HOLD SPECIMEN: NORMAL

## 2021-06-19 PROCEDURE — 81001 URINALYSIS AUTO W/SCOPE: CPT | Performed by: EMERGENCY MEDICINE

## 2021-06-19 PROCEDURE — 96375 TX/PRO/DX INJ NEW DRUG ADDON: CPT

## 2021-06-19 PROCEDURE — 83690 ASSAY OF LIPASE: CPT | Performed by: EMERGENCY MEDICINE

## 2021-06-19 PROCEDURE — 80053 COMPREHEN METABOLIC PANEL: CPT | Performed by: EMERGENCY MEDICINE

## 2021-06-19 PROCEDURE — 25010000002 ONDANSETRON PER 1 MG: Performed by: EMERGENCY MEDICINE

## 2021-06-19 PROCEDURE — 25010000002 HYDROMORPHONE 1 MG/ML SOLUTION: Performed by: EMERGENCY MEDICINE

## 2021-06-19 PROCEDURE — 99283 EMERGENCY DEPT VISIT LOW MDM: CPT

## 2021-06-19 PROCEDURE — 96374 THER/PROPH/DIAG INJ IV PUSH: CPT

## 2021-06-19 PROCEDURE — 85025 COMPLETE CBC W/AUTO DIFF WBC: CPT | Performed by: EMERGENCY MEDICINE

## 2021-06-19 PROCEDURE — 74176 CT ABD & PELVIS W/O CONTRAST: CPT

## 2021-06-19 RX ORDER — MONTELUKAST SODIUM 10 MG/1
10 TABLET ORAL NIGHTLY
COMMUNITY

## 2021-06-19 RX ORDER — ASPIRIN 81 MG/1
81 TABLET, CHEWABLE ORAL DAILY
COMMUNITY

## 2021-06-19 RX ORDER — FAMOTIDINE 20 MG/1
20 TABLET, FILM COATED ORAL 2 TIMES DAILY
COMMUNITY
End: 2021-12-20 | Stop reason: SDUPTHER

## 2021-06-19 RX ORDER — DIPHENOXYLATE HYDROCHLORIDE AND ATROPINE SULFATE 2.5; .025 MG/1; MG/1
1 TABLET ORAL DAILY
COMMUNITY

## 2021-06-19 RX ORDER — LISINOPRIL 10 MG/1
10 TABLET ORAL DAILY
COMMUNITY

## 2021-06-19 RX ORDER — ACETAMINOPHEN 500 MG
1000 TABLET ORAL EVERY 6 HOURS PRN
Qty: 30 TABLET | Refills: 0 | Status: SHIPPED | OUTPATIENT
Start: 2021-06-19

## 2021-06-19 RX ORDER — PRAVASTATIN SODIUM 40 MG
40 TABLET ORAL DAILY
COMMUNITY

## 2021-06-19 RX ORDER — IBUPROFEN 800 MG/1
400 TABLET ORAL EVERY 8 HOURS PRN
Qty: 15 TABLET | Refills: 0 | Status: SHIPPED | OUTPATIENT
Start: 2021-06-19

## 2021-06-19 RX ORDER — LEVOTHYROXINE SODIUM 0.12 MG/1
125 TABLET ORAL DAILY
COMMUNITY

## 2021-06-19 RX ORDER — SODIUM CHLORIDE 0.9 % (FLUSH) 0.9 %
10 SYRINGE (ML) INJECTION AS NEEDED
Status: DISCONTINUED | OUTPATIENT
Start: 2021-06-19 | End: 2021-06-19 | Stop reason: HOSPADM

## 2021-06-19 RX ORDER — FLUTICASONE PROPIONATE 110 UG/1
1 AEROSOL, METERED RESPIRATORY (INHALATION)
COMMUNITY

## 2021-06-19 RX ORDER — ONDANSETRON 2 MG/ML
4 INJECTION INTRAMUSCULAR; INTRAVENOUS ONCE
Status: COMPLETED | OUTPATIENT
Start: 2021-06-19 | End: 2021-06-19

## 2021-06-19 RX ADMIN — HYDROMORPHONE HYDROCHLORIDE 0.5 MG: 1 INJECTION, SOLUTION INTRAMUSCULAR; INTRAVENOUS; SUBCUTANEOUS at 09:26

## 2021-06-19 RX ADMIN — ONDANSETRON 4 MG: 2 INJECTION INTRAMUSCULAR; INTRAVENOUS at 09:26

## 2021-06-24 ENCOUNTER — HOSPITAL ENCOUNTER (OUTPATIENT)
Dept: MAMMOGRAPHY | Facility: HOSPITAL | Age: 66
Discharge: HOME | End: 2021-06-24
Admitting: FAMILY MEDICINE

## 2021-06-24 DIAGNOSIS — Z12.31 VISIT FOR SCREENING MAMMOGRAM: ICD-10-CM

## 2021-06-24 PROCEDURE — 77067 SCR MAMMO BI INCL CAD: CPT | Performed by: RADIOLOGY

## 2021-06-24 PROCEDURE — 77063 BREAST TOMOSYNTHESIS BI: CPT

## 2021-06-24 PROCEDURE — 77063 BREAST TOMOSYNTHESIS BI: CPT | Performed by: RADIOLOGY

## 2021-06-24 PROCEDURE — 77067 SCR MAMMO BI INCL CAD: CPT

## 2021-10-01 ENCOUNTER — TRANSCRIBE ORDERS (OUTPATIENT)
Dept: LAB | Facility: HOSPITAL | Age: 66
End: 2021-10-01

## 2021-10-01 ENCOUNTER — LAB (OUTPATIENT)
Dept: LAB | Facility: HOSPITAL | Age: 66
End: 2021-10-01

## 2021-10-01 DIAGNOSIS — E78.5 HYPERLIPIDEMIA, UNSPECIFIED HYPERLIPIDEMIA TYPE: Primary | ICD-10-CM

## 2021-10-01 DIAGNOSIS — E78.5 HYPERLIPIDEMIA, UNSPECIFIED HYPERLIPIDEMIA TYPE: ICD-10-CM

## 2021-10-01 LAB
ALBUMIN SERPL-MCNC: 4.4 G/DL (ref 3.5–5.2)
ALBUMIN/GLOB SERPL: 1.7 G/DL
ALP SERPL-CCNC: 104 U/L (ref 39–117)
ALT SERPL W P-5'-P-CCNC: 24 U/L (ref 1–33)
ANION GAP SERPL CALCULATED.3IONS-SCNC: 10.9 MMOL/L (ref 5–15)
AST SERPL-CCNC: 30 U/L (ref 1–32)
BILIRUB SERPL-MCNC: 0.2 MG/DL (ref 0–1.2)
BUN SERPL-MCNC: 17 MG/DL (ref 8–23)
BUN/CREAT SERPL: 23.6 (ref 7–25)
CALCIUM SPEC-SCNC: 9.7 MG/DL (ref 8.6–10.5)
CHLORIDE SERPL-SCNC: 102 MMOL/L (ref 98–107)
CO2 SERPL-SCNC: 27.1 MMOL/L (ref 22–29)
CREAT SERPL-MCNC: 0.72 MG/DL (ref 0.57–1)
GFR SERPL CREATININE-BSD FRML MDRD: 81 ML/MIN/1.73
GLOBULIN UR ELPH-MCNC: 2.6 GM/DL
GLUCOSE SERPL-MCNC: 98 MG/DL (ref 65–99)
POTASSIUM SERPL-SCNC: 4.2 MMOL/L (ref 3.5–5.2)
PROT SERPL-MCNC: 7 G/DL (ref 6–8.5)
SODIUM SERPL-SCNC: 140 MMOL/L (ref 136–145)

## 2021-10-01 PROCEDURE — 80053 COMPREHEN METABOLIC PANEL: CPT

## 2021-10-01 PROCEDURE — 36415 COLL VENOUS BLD VENIPUNCTURE: CPT

## 2021-12-20 RX ORDER — FAMOTIDINE 20 MG/1
20 TABLET, FILM COATED ORAL 2 TIMES DAILY
Qty: 180 TABLET | Refills: 1 | Status: SHIPPED | OUTPATIENT
Start: 2021-12-20 | End: 2022-05-16

## 2022-01-22 ENCOUNTER — LAB (OUTPATIENT)
Dept: LAB | Facility: HOSPITAL | Age: 67
End: 2022-01-22

## 2022-01-22 ENCOUNTER — TRANSCRIBE ORDERS (OUTPATIENT)
Dept: ADMINISTRATIVE | Facility: HOSPITAL | Age: 67
End: 2022-01-22

## 2022-01-22 DIAGNOSIS — E78.5 HYPERLIPIDEMIA, UNSPECIFIED HYPERLIPIDEMIA TYPE: ICD-10-CM

## 2022-01-22 DIAGNOSIS — E03.9 PRIMARY HYPOTHYROIDISM: ICD-10-CM

## 2022-01-22 DIAGNOSIS — R53.83 TIREDNESS: Primary | ICD-10-CM

## 2022-01-22 DIAGNOSIS — R53.83 TIREDNESS: ICD-10-CM

## 2022-01-22 LAB
ALBUMIN SERPL-MCNC: 4.2 G/DL (ref 3.5–5.2)
ALBUMIN/GLOB SERPL: 1.6 G/DL
ALP SERPL-CCNC: 92 U/L (ref 39–117)
ALT SERPL W P-5'-P-CCNC: 23 U/L (ref 1–33)
ANION GAP SERPL CALCULATED.3IONS-SCNC: 9.4 MMOL/L (ref 5–15)
AST SERPL-CCNC: 22 U/L (ref 1–32)
BASOPHILS # BLD AUTO: 0.04 10*3/MM3 (ref 0–0.2)
BASOPHILS NFR BLD AUTO: 0.7 % (ref 0–1.5)
BILIRUB SERPL-MCNC: 0.2 MG/DL (ref 0–1.2)
BUN SERPL-MCNC: 18 MG/DL (ref 8–23)
BUN/CREAT SERPL: 25 (ref 7–25)
CALCIUM SPEC-SCNC: 9.7 MG/DL (ref 8.6–10.5)
CHLORIDE SERPL-SCNC: 104 MMOL/L (ref 98–107)
CHOLEST SERPL-MCNC: 190 MG/DL (ref 0–200)
CO2 SERPL-SCNC: 30.6 MMOL/L (ref 22–29)
CREAT SERPL-MCNC: 0.72 MG/DL (ref 0.57–1)
DEPRECATED RDW RBC AUTO: 41.5 FL (ref 37–54)
EOSINOPHIL # BLD AUTO: 0.06 10*3/MM3 (ref 0–0.4)
EOSINOPHIL NFR BLD AUTO: 1 % (ref 0.3–6.2)
ERYTHROCYTE [DISTWIDTH] IN BLOOD BY AUTOMATED COUNT: 12.6 % (ref 12.3–15.4)
GFR SERPL CREATININE-BSD FRML MDRD: 81 ML/MIN/1.73
GLOBULIN UR ELPH-MCNC: 2.6 GM/DL
GLUCOSE SERPL-MCNC: 94 MG/DL (ref 65–99)
HCT VFR BLD AUTO: 39.4 % (ref 34–46.6)
HDLC SERPL-MCNC: 66 MG/DL (ref 40–60)
HGB BLD-MCNC: 13.1 G/DL (ref 12–15.9)
IMM GRANULOCYTES # BLD AUTO: 0.01 10*3/MM3 (ref 0–0.05)
IMM GRANULOCYTES NFR BLD AUTO: 0.2 % (ref 0–0.5)
LDLC SERPL CALC-MCNC: 103 MG/DL (ref 0–100)
LDLC/HDLC SERPL: 1.51 {RATIO}
LYMPHOCYTES # BLD AUTO: 1.97 10*3/MM3 (ref 0.7–3.1)
LYMPHOCYTES NFR BLD AUTO: 33.7 % (ref 19.6–45.3)
MCH RBC QN AUTO: 29.6 PG (ref 26.6–33)
MCHC RBC AUTO-ENTMCNC: 33.2 G/DL (ref 31.5–35.7)
MCV RBC AUTO: 89.1 FL (ref 79–97)
MONOCYTES # BLD AUTO: 0.45 10*3/MM3 (ref 0.1–0.9)
MONOCYTES NFR BLD AUTO: 7.7 % (ref 5–12)
NEUTROPHILS NFR BLD AUTO: 3.32 10*3/MM3 (ref 1.7–7)
NEUTROPHILS NFR BLD AUTO: 56.7 % (ref 42.7–76)
NRBC BLD AUTO-RTO: 0 /100 WBC (ref 0–0.2)
PLATELET # BLD AUTO: 296 10*3/MM3 (ref 140–450)
PMV BLD AUTO: 10 FL (ref 6–12)
POTASSIUM SERPL-SCNC: 4.7 MMOL/L (ref 3.5–5.2)
PROT SERPL-MCNC: 6.8 G/DL (ref 6–8.5)
RBC # BLD AUTO: 4.42 10*6/MM3 (ref 3.77–5.28)
SODIUM SERPL-SCNC: 144 MMOL/L (ref 136–145)
T4 FREE SERPL-MCNC: 1.21 NG/DL (ref 0.93–1.7)
TRIGL SERPL-MCNC: 122 MG/DL (ref 0–150)
TSH SERPL DL<=0.05 MIU/L-ACNC: 1.43 UIU/ML (ref 0.27–4.2)
VLDLC SERPL-MCNC: 21 MG/DL (ref 5–40)
WBC NRBC COR # BLD: 5.85 10*3/MM3 (ref 3.4–10.8)

## 2022-01-22 PROCEDURE — 80053 COMPREHEN METABOLIC PANEL: CPT

## 2022-01-22 PROCEDURE — 84439 ASSAY OF FREE THYROXINE: CPT

## 2022-01-22 PROCEDURE — 80061 LIPID PANEL: CPT

## 2022-01-22 PROCEDURE — 85025 COMPLETE CBC W/AUTO DIFF WBC: CPT

## 2022-01-22 PROCEDURE — 84443 ASSAY THYROID STIM HORMONE: CPT

## 2022-01-22 PROCEDURE — 36415 COLL VENOUS BLD VENIPUNCTURE: CPT

## 2022-01-27 NOTE — ED PROVIDER NOTES
"Subjective   Mony Echeverria is a 65 y.o. year old female who presents to the emergency department today with complaints of right flank pain. She does also have some pain to her central lower back, but states it is far worse on the right side. The patient reports that her pain began approximately one week ago. She denies any vomiting, fever, or other symptoms aside from her pain today. She does have a history of kidney stones along with hysterectomy, , cholecystectomy, and appendectomy per her records. Her current pain reminds her of prior kidney stones and she does feel as if this may be the cause of her discomfort. The patient denies smoking per triage, but does drink alcohol occasionally. She denies drug use. There are no other acute complaints at this time.      History provided by:  Patient and medical records  Flank Pain  Pain location:  R flank (lower back)  Pain quality comment:  \"pain\"  Pain radiates to:  Does not radiate  Pain severity:  Moderate  Onset quality:  Gradual  Duration:  1 week  Timing:  Intermittent  Progression:  Unchanged  Chronicity:  New  Context comment:  History of kidney stones.  Relieved by:  None tried  Worsened by:  Nothing  Ineffective treatments:  None tried  Associated symptoms: no chest pain, no chills, no cough, no diarrhea, no dysuria, no fever, no shortness of breath and no vomiting    Risk factors: no alcohol abuse (only occasional use)        Review of Systems   Constitutional: Negative for chills and fever.   HENT: Negative for nosebleeds.    Eyes: Negative for redness.   Respiratory: Negative for cough and shortness of breath.    Cardiovascular: Negative for chest pain.   Gastrointestinal: Negative for diarrhea and vomiting.   Genitourinary: Positive for flank pain (right). Negative for dysuria and frequency.   Musculoskeletal: Positive for back pain (lower; worse on right). Negative for neck pain.   Skin: Negative for rash.   Neurological: Negative for " Patient to ed with complaints of continued worsening sob. seizures and syncope.   All other systems reviewed and are negative.      Past Medical History:   Diagnosis Date   • Asthma    • Disease of thyroid gland    • Hyperlipidemia    • Hypertension    • Kidney stone        Allergies   Allergen Reactions   • Cephalexin Rash   • Sulfa Antibiotics Rash       Past Surgical History:   Procedure Laterality Date   • APPENDECTOMY     • CATARACT EXTRACTION     •  SECTION     • CHOLECYSTECTOMY     • EXPLORATORY LAPAROTOMY     • HYSTERECTOMY         History reviewed. No pertinent family history.    Social History     Socioeconomic History   • Marital status:      Spouse name: Not on file   • Number of children: Not on file   • Years of education: Not on file   • Highest education level: Not on file   Tobacco Use   • Smoking status: Never Smoker   • Smokeless tobacco: Never Used   Substance and Sexual Activity   • Alcohol use: Yes     Comment: occasionally    • Drug use: Never         Objective   Physical Exam  Vitals and nursing note reviewed.   Constitutional:       General: She is not in acute distress.  HENT:      Head: Normocephalic and atraumatic.      Nose: Nose normal.      Mouth/Throat:      Mouth: Mucous membranes are moist.   Eyes:      General: No scleral icterus.  Cardiovascular:      Rate and Rhythm: Normal rate and regular rhythm.      Heart sounds: Normal heart sounds. No murmur heard.     Pulmonary:      Effort: No respiratory distress.      Breath sounds: Normal breath sounds.   Abdominal:      Palpations: Abdomen is soft.      Tenderness: There is right CVA tenderness (mild) and left CVA tenderness (mild).      Hernia: No hernia is present.   Musculoskeletal:         General: No tenderness. Normal range of motion.      Cervical back: Normal range of motion and neck supple. No tenderness.      Right lower leg: No edema.      Left lower leg: No edema.   Skin:     General: Skin is warm and dry.   Neurological:      General: No focal deficit present.       Mental Status: She is alert. Mental status is at baseline.      Sensory: No sensory deficit.      Motor: No weakness.   Psychiatric:         Behavior: Behavior normal.         Procedures         ED Course     CT Abdomen Pelvis Without Contrast    Result Date: 6/19/2021  PROCEDURE: CT ABDOMEN PELVIS WO CONTRAST  COMPARISON: [facility], CT, ABDOMEN/PELVIS WITH CONTRAST, 10/07/2015, 10:28.  INDICATIONS: Flank pain, kidney stone suspected  TECHNIQUE: CT images were created without intravenous contrast.   PROTOCOL:   Standard imaging protocol performed    RADIATION:   DLP: 538.3mGy*cm   Automated exposure control was utilized to minimize radiation dose.  FINDINGS:  Abdomen:  Included lung bases are clear.  Liver, spleen, adrenal glands, pancreas have an unremarkable unenhanced appearance.  Previous cholecystectomy.  Bowel loops are nondilated.  Moderate colonic stool burden.  Duplicated right renal collecting system.  There is no radiodense urinary system calculus or hydronephrosis.  Pelvis:  No radiodense bladder calculus.  No pelvic mass or fluid.  No aggressive appearing bone change.  CONCLUSION: No acute findings.  No radiodense urinary system calculus or hydronephrosis.  Duplicated right renal collecting system.     TRINA OLIVEROS MD       Electronically Signed and Approved By: TRINA OLIVEROS MD on 6/19/2021 at 9:05             Labs Reviewed   URINALYSIS W/ MICROSCOPIC IF INDICATED (NO CULTURE) - Abnormal; Notable for the following components:       Result Value    Blood, UA Small (1+) (*)     Leuk Esterase, UA Trace (*)     All other components within normal limits   URINALYSIS, MICROSCOPIC ONLY - Abnormal; Notable for the following components:    RBC, UA 0-2 (*)     WBC, UA 0-2 (*)     All other components within normal limits   LIPASE - Normal   CBC WITH AUTO DIFFERENTIAL - Normal   RAINBOW DRAW    Narrative:     The following orders were created for panel order [facility] Draw.  Procedure                                Abnormality         Status                     ---------                               -----------         ------                     Green Top (Gel)[[id number]]                                  Final result               Lavender Top[[id number]]                                     Final result               Gold Top - SST[[id number]]                                   Final result                 Please view results for these tests on the individual orders.   COMPREHENSIVE METABOLIC PANEL    Narrative:     GFR Normal >60  Chronic Kidney Disease <60  Kidney Failure <15     CBC AND DIFFERENTIAL    Narrative:     The following orders were created for panel order CBC & Differential.  Procedure                               Abnormality         Status                     ---------                               -----------         ------                     CBC Auto Differential[[id number]]        Normal              Final result                 Please view results for these tests on the individual orders.   GREEN TOP   LAVENDER TOP   GOLD TOP - SST                                            MDM    65-year-old female presents with flank pain and is concerned that she may have a kidney stone as it feels very similar to past kidney stones.  No history of trauma to suspect fracture or dislocation.  No rash to be consistent with shingles.  Urinalysis does not show any urinary tract infection or reason to suspect pyelonephritis.  The patient CT scan shows no evidence of internal injury or illness and shows no stone.  On reevaluation the patient was in stable condition and pain was controlled.  She will be discharged to follow-up with her PCP.    Final diagnoses:   Flank pain       Documentation assistance provided by radu Fraser.  Information recorded by the scribrock was done at my direction and has been verified and validated by me.     Irasema Fraser  06/19/21 0873       Irasema Fraser  06/19/21 0857        Irasema Fraser  06/19/21 0907       Irasema Fraser  06/19/21 0907       Kj López DO  06/19/21 [id number]

## 2022-05-16 RX ORDER — FAMOTIDINE 20 MG/1
TABLET, FILM COATED ORAL
Qty: 180 TABLET | Refills: 2 | Status: SHIPPED | OUTPATIENT
Start: 2022-05-16

## 2022-05-27 ENCOUNTER — TRANSCRIBE ORDERS (OUTPATIENT)
Dept: ADMINISTRATIVE | Facility: HOSPITAL | Age: 67
End: 2022-05-27

## 2022-05-27 DIAGNOSIS — Z12.31 OTHER SCREENING MAMMOGRAM: Primary | ICD-10-CM

## 2022-07-14 ENCOUNTER — TRANSCRIBE ORDERS (OUTPATIENT)
Dept: LAB | Facility: HOSPITAL | Age: 67
End: 2022-07-14

## 2022-07-14 ENCOUNTER — LAB (OUTPATIENT)
Dept: LAB | Facility: HOSPITAL | Age: 67
End: 2022-07-14

## 2022-07-14 DIAGNOSIS — E78.5 HYPERLIPIDEMIA, UNSPECIFIED HYPERLIPIDEMIA TYPE: ICD-10-CM

## 2022-07-14 DIAGNOSIS — E78.5 HYPERLIPIDEMIA, UNSPECIFIED HYPERLIPIDEMIA TYPE: Primary | ICD-10-CM

## 2022-07-14 LAB
ALBUMIN SERPL-MCNC: 4.6 G/DL (ref 3.5–5.2)
ALBUMIN/GLOB SERPL: 1.9 G/DL
ALP SERPL-CCNC: 93 U/L (ref 39–117)
ALT SERPL W P-5'-P-CCNC: 24 U/L (ref 1–33)
ANION GAP SERPL CALCULATED.3IONS-SCNC: 8 MMOL/L (ref 5–15)
AST SERPL-CCNC: 27 U/L (ref 1–32)
BILIRUB SERPL-MCNC: 0.2 MG/DL (ref 0–1.2)
BUN SERPL-MCNC: 15 MG/DL (ref 8–23)
BUN/CREAT SERPL: 22.4 (ref 7–25)
CALCIUM SPEC-SCNC: 9.7 MG/DL (ref 8.6–10.5)
CHLORIDE SERPL-SCNC: 102 MMOL/L (ref 98–107)
CO2 SERPL-SCNC: 29 MMOL/L (ref 22–29)
CREAT SERPL-MCNC: 0.67 MG/DL (ref 0.57–1)
EGFRCR SERPLBLD CKD-EPI 2021: 96.5 ML/MIN/1.73
GLOBULIN UR ELPH-MCNC: 2.4 GM/DL
GLUCOSE SERPL-MCNC: 93 MG/DL (ref 65–99)
POTASSIUM SERPL-SCNC: 4.3 MMOL/L (ref 3.5–5.2)
PROT SERPL-MCNC: 7 G/DL (ref 6–8.5)
SODIUM SERPL-SCNC: 139 MMOL/L (ref 136–145)

## 2022-07-14 PROCEDURE — 36415 COLL VENOUS BLD VENIPUNCTURE: CPT

## 2022-07-14 PROCEDURE — 80053 COMPREHEN METABOLIC PANEL: CPT

## 2022-07-25 ENCOUNTER — HOSPITAL ENCOUNTER (OUTPATIENT)
Dept: MAMMOGRAPHY | Facility: HOSPITAL | Age: 67
Discharge: HOME | End: 2022-07-25
Admitting: FAMILY MEDICINE

## 2022-07-25 DIAGNOSIS — Z12.31 OTHER SCREENING MAMMOGRAM: ICD-10-CM

## 2022-07-25 PROCEDURE — 77063 BREAST TOMOSYNTHESIS BI: CPT

## 2022-07-25 PROCEDURE — 77067 SCR MAMMO BI INCL CAD: CPT

## 2022-11-12 ENCOUNTER — LAB (OUTPATIENT)
Dept: LAB | Facility: HOSPITAL | Age: 67
End: 2022-11-12

## 2022-11-12 ENCOUNTER — TRANSCRIBE ORDERS (OUTPATIENT)
Dept: LAB | Facility: HOSPITAL | Age: 67
End: 2022-11-12

## 2022-11-12 DIAGNOSIS — E78.5 HYPERLIPIDEMIA, UNSPECIFIED HYPERLIPIDEMIA TYPE: Primary | ICD-10-CM

## 2022-11-12 DIAGNOSIS — E78.5 HYPERLIPIDEMIA, UNSPECIFIED HYPERLIPIDEMIA TYPE: ICD-10-CM

## 2022-11-12 LAB
ALBUMIN SERPL-MCNC: 4.6 G/DL (ref 3.5–5.2)
ALBUMIN/GLOB SERPL: 2 G/DL
ALP SERPL-CCNC: 93 U/L (ref 39–117)
ALT SERPL W P-5'-P-CCNC: 22 U/L (ref 1–33)
ANION GAP SERPL CALCULATED.3IONS-SCNC: 9 MMOL/L (ref 5–15)
AST SERPL-CCNC: 25 U/L (ref 1–32)
BILIRUB SERPL-MCNC: 0.3 MG/DL (ref 0–1.2)
BUN SERPL-MCNC: 17 MG/DL (ref 8–23)
BUN/CREAT SERPL: 23.9 (ref 7–25)
CALCIUM SPEC-SCNC: 9.8 MG/DL (ref 8.6–10.5)
CHLORIDE SERPL-SCNC: 103 MMOL/L (ref 98–107)
CO2 SERPL-SCNC: 29 MMOL/L (ref 22–29)
CREAT SERPL-MCNC: 0.71 MG/DL (ref 0.57–1)
EGFRCR SERPLBLD CKD-EPI 2021: 93.9 ML/MIN/1.73
GLOBULIN UR ELPH-MCNC: 2.3 GM/DL
GLUCOSE SERPL-MCNC: 90 MG/DL (ref 65–99)
POTASSIUM SERPL-SCNC: 4 MMOL/L (ref 3.5–5.2)
PROT SERPL-MCNC: 6.9 G/DL (ref 6–8.5)
SODIUM SERPL-SCNC: 141 MMOL/L (ref 136–145)

## 2022-11-12 PROCEDURE — 36415 COLL VENOUS BLD VENIPUNCTURE: CPT

## 2022-11-12 PROCEDURE — 80053 COMPREHEN METABOLIC PANEL: CPT

## 2022-12-15 RX ORDER — FAMOTIDINE 20 MG/1
TABLET, FILM COATED ORAL
Qty: 180 TABLET | Refills: 0 | OUTPATIENT
Start: 2022-12-15

## 2023-03-11 ENCOUNTER — TRANSCRIBE ORDERS (OUTPATIENT)
Dept: LAB | Facility: HOSPITAL | Age: 68
End: 2023-03-11
Payer: MEDICARE

## 2023-03-11 ENCOUNTER — LAB (OUTPATIENT)
Dept: LAB | Facility: HOSPITAL | Age: 68
End: 2023-03-11
Payer: MEDICARE

## 2023-03-11 DIAGNOSIS — E05.90 HYPERTHYROIDISM: ICD-10-CM

## 2023-03-11 DIAGNOSIS — E78.5 HYPERLIPIDEMIA, UNSPECIFIED HYPERLIPIDEMIA TYPE: ICD-10-CM

## 2023-03-11 DIAGNOSIS — E78.5 HYPERLIPIDEMIA, UNSPECIFIED HYPERLIPIDEMIA TYPE: Primary | ICD-10-CM

## 2023-03-11 LAB
ALBUMIN SERPL-MCNC: 4.2 G/DL (ref 3.5–5.2)
ALBUMIN/GLOB SERPL: 1.6 G/DL
ALP SERPL-CCNC: 86 U/L (ref 39–117)
ALT SERPL W P-5'-P-CCNC: 14 U/L (ref 1–33)
ANION GAP SERPL CALCULATED.3IONS-SCNC: 11.7 MMOL/L (ref 5–15)
AST SERPL-CCNC: 18 U/L (ref 1–32)
BILIRUB SERPL-MCNC: 0.2 MG/DL (ref 0–1.2)
BUN SERPL-MCNC: 16 MG/DL (ref 8–23)
BUN/CREAT SERPL: 22.2 (ref 7–25)
CALCIUM SPEC-SCNC: 9.8 MG/DL (ref 8.6–10.5)
CHLORIDE SERPL-SCNC: 101 MMOL/L (ref 98–107)
CHOLEST SERPL-MCNC: 174 MG/DL (ref 0–200)
CO2 SERPL-SCNC: 28.3 MMOL/L (ref 22–29)
CREAT SERPL-MCNC: 0.72 MG/DL (ref 0.57–1)
EGFRCR SERPLBLD CKD-EPI 2021: 91.8 ML/MIN/1.73
GLOBULIN UR ELPH-MCNC: 2.7 GM/DL
GLUCOSE SERPL-MCNC: 96 MG/DL (ref 65–99)
HDLC SERPL-MCNC: 57 MG/DL (ref 40–60)
LDLC SERPL CALC-MCNC: 90 MG/DL (ref 0–100)
LDLC/HDLC SERPL: 1.51 {RATIO}
POTASSIUM SERPL-SCNC: 4.2 MMOL/L (ref 3.5–5.2)
PROT SERPL-MCNC: 6.9 G/DL (ref 6–8.5)
SODIUM SERPL-SCNC: 141 MMOL/L (ref 136–145)
T4 FREE SERPL-MCNC: 1.25 NG/DL (ref 0.93–1.7)
TRIGL SERPL-MCNC: 155 MG/DL (ref 0–150)
VLDLC SERPL-MCNC: 27 MG/DL (ref 5–40)

## 2023-03-11 PROCEDURE — 36415 COLL VENOUS BLD VENIPUNCTURE: CPT

## 2023-03-11 PROCEDURE — 80061 LIPID PANEL: CPT

## 2023-03-11 PROCEDURE — 80053 COMPREHEN METABOLIC PANEL: CPT

## 2023-03-11 PROCEDURE — 84443 ASSAY THYROID STIM HORMONE: CPT | Performed by: FAMILY MEDICINE

## 2023-03-11 PROCEDURE — 84439 ASSAY OF FREE THYROXINE: CPT

## 2023-03-14 LAB — TSH SERPL DL<=0.05 MIU/L-ACNC: 1.05 UIU/ML (ref 0.27–4.2)

## 2023-09-06 NOTE — PROGRESS NOTES
Chief Complaint   Follow up    History of Present Illness       Mony Echeverria is a 67 y.o. female who presents to [facility] for follow-up with a history of reflux and previous esophageal dilation.  Patient continues on Pepcid 20 mg twice daily with good control of her reflux.  Patient denies fever, nausea, vomiting, weight loss, night sweats, melena, hematochezia, hematemesis.  Patient requesting refill of Pepcid at this time.    EGD/colonoscopy: Review of the patient's most recent EGD and colonoscopy performed by Dr. Bolden 12/30/2020 revealed a medium size hiatal hernia and a benign intrinsic stricture in the GE junction with balloon dilation up to 18 mm. Grade 1 esophagitis in the GE junction. Normal mucosa in the stomach. Normal mucosa was noted in the colon and grade 1 internal hemorrhoids. Pathology reports normal.      Most recent labs on 03.11.2023 and 07.03.2023  Results       Result Review :   The following data was reviewed by: RIAN Mann on 09/07/2023:    CMP          11/12/2022    07:19 3/11/2023    07:41 7/3/2023    08:39   CMP   Glucose 90  96  89    BUN 17  16  13    Creatinine 0.71  0.72  0.80    EGFR 93.9  91.8  80.9    Sodium 141  141  143    Potassium 4.0  4.2  4.3    Chloride 103  101  104    Calcium 9.8  9.8  10.1    Total Protein 6.9  6.9  6.9    Albumin 4.60  4.2  4.4    Globulin 2.3  2.7  2.5    Total Bilirubin 0.3  0.2  0.2    Alkaline Phosphatase 93  86  82    AST (SGOT) 25  18  27    ALT (SGPT) 22  14  25    Albumin/Globulin Ratio 2.0  1.6  1.8    BUN/Creatinine Ratio 23.9  22.2  16.3    Anion Gap 9.0  11.7  10.6          Iron Profile No results found for: IRON, TIBC, LABIRON, TRANSFERRIN  Ferritin No results found for: FERRITIN            Past Medical History       Past Medical History:   Diagnosis Date    Asthma     Disease of thyroid gland     Hyperlipidemia     Hypertension     Kidney stone        Past Surgical History:   Procedure  "Laterality Date    APPENDECTOMY      CATARACT EXTRACTION       SECTION      CHOLECYSTECTOMY      COLONOSCOPY      EXPLORATORY LAPAROTOMY      HYSTERECTOMY      UPPER GASTROINTESTINAL ENDOSCOPY           Current Outpatient Medications:     albuterol sulfate  (90 Base) MCG/ACT inhaler, Ventolin HFA 90 mcg/actuation inhalation HFA aerosol inhaler inhale 1 - 2 puffs by inhalation route every 4-6 hours as needed   Suspended, Disp: , Rfl:     aspirin 81 MG chewable tablet, Chew 1 tablet Daily., Disp: , Rfl:     EPINEPHrine (EPIPEN) 0.3 MG/0.3ML solution auto-injector injection, AS DIRECTED INJECTION AS NEEDED, Disp: , Rfl:     famotidine (PEPCID) 20 MG tablet, Take 1 tablet by mouth 2 (Two) Times a Day., Disp: 180 tablet, Rfl: 3    fluticasone (FLOVENT HFA) 110 MCG/ACT inhaler, Inhale 1 puff 2 (Two) Times a Day., Disp: , Rfl:     Glycerin-Polysorbate 80 (REFRESH DRY EYE THERAPY OP), Apply  to eye(s) as directed by provider., Disp: , Rfl:     levothyroxine (SYNTHROID, LEVOTHROID) 125 MCG tablet, Take 1 tablet by mouth Daily., Disp: , Rfl:     lisinopril (PRINIVIL,ZESTRIL) 10 MG tablet, Take 1 tablet by mouth Daily., Disp: , Rfl:     Loratadine (CLARITIN PO), Take  by mouth., Disp: , Rfl:     montelukast (SINGULAIR) 10 MG tablet, Take 1 tablet by mouth Every Night., Disp: , Rfl:     multivitamin (THERAGRAN) tablet tablet, Take 1 tablet by mouth Daily., Disp: , Rfl:     pravastatin (PRAVACHOL) 40 MG tablet, Take 1 tablet by mouth Daily., Disp: , Rfl:      Allergies   Allergen Reactions    Seasonal Ic [Octacosanol] Unknown - Low Severity    Cephalexin Rash    Latex Rash    Sulfa Antibiotics Rash       Family History   Problem Relation Age of Onset    Colon cancer Neg Hx         Social History     Social History Narrative    Not on file       Objective     Vital Signs:   /77 (BP Location: Right arm, Patient Position: Sitting, Cuff Size: Adult)   Pulse 66   Ht 170.2 cm (67\")   Wt 79.4 kg (175 lb)   SpO2 " 98%   BMI 27.41 kg/m²       Physical Exam  Constitutional:       General: She is not in acute distress.     Appearance: Normal appearance. She is well-developed and normal weight.   Eyes:      Conjunctiva/sclera: Conjunctivae normal.      Pupils: Pupils are equal, round, and reactive to light.      Visual Fields: Right eye visual fields normal and left eye visual fields normal.   Cardiovascular:      Rate and Rhythm: Normal rate and regular rhythm.      Heart sounds: Normal heart sounds.   Pulmonary:      Effort: Pulmonary effort is normal. No retractions.      Breath sounds: Normal breath sounds and air entry.      Comments: Inspection of chest: normal appearance  Abdominal:      General: Bowel sounds are normal.      Palpations: Abdomen is soft.      Tenderness: There is no abdominal tenderness.      Comments: No appreciable hepatosplenomegaly   Musculoskeletal:      Cervical back: Neck supple.      Right lower leg: No edema.      Left lower leg: No edema.   Lymphadenopathy:      Cervical: No cervical adenopathy.   Skin:     Findings: No lesion.      Comments: Turgor normal   Neurological:      Mental Status: She is alert and oriented to person, place, and time.   Psychiatric:         Mood and Affect: Mood and affect normal.         Assessment & Plan          Assessment and Plan    Diagnoses and all orders for this visit:    1. Gastroesophageal reflux disease, unspecified whether esophagitis present  -     famotidine (PEPCID) 20 MG tablet; Take 1 tablet by mouth 2 (Two) Times a Day.  Dispense: 180 tablet; Refill: 3      67-year-old female presenting the office today for follow-up with a history of reflux and previous esophageal dilation.  Patient continues on Pepcid 20 mg twice daily with good control of her reflux I have therefore refilled this medication.  Patient be due for repeat colonoscopy and EGD in December 2025.  Patient will follow-up in 1 year.  Patient agreeable to this plan will call with any  questions or concerns.          Follow Up       Follow Up   Return in about 1 year (around 9/7/2024).  Patient was given instructions and counseling regarding her condition or for health maintenance advice. Please see specific information pulled into the AVS if appropriate.

## 2023-09-07 ENCOUNTER — OFFICE VISIT (OUTPATIENT)
Dept: GASTROENTEROLOGY | Facility: CLINIC | Age: 68
End: 2023-09-07
Payer: MEDICARE

## 2023-09-07 VITALS
DIASTOLIC BLOOD PRESSURE: 77 MMHG | BODY MASS INDEX: 27.47 KG/M2 | WEIGHT: 175 LBS | OXYGEN SATURATION: 98 % | HEIGHT: 67 IN | HEART RATE: 66 BPM | SYSTOLIC BLOOD PRESSURE: 128 MMHG

## 2023-09-07 DIAGNOSIS — K21.9 GASTROESOPHAGEAL REFLUX DISEASE, UNSPECIFIED WHETHER ESOPHAGITIS PRESENT: ICD-10-CM

## 2023-09-07 RX ORDER — FAMOTIDINE 20 MG/1
20 TABLET, FILM COATED ORAL 2 TIMES DAILY
Qty: 180 TABLET | Refills: 3 | Status: SHIPPED | OUTPATIENT
Start: 2023-09-07

## 2023-09-07 RX ORDER — EPINEPHRINE 0.3 MG/.3ML
INJECTION SUBCUTANEOUS
COMMUNITY

## 2023-09-07 RX ORDER — ALBUTEROL SULFATE 90 UG/1
AEROSOL, METERED RESPIRATORY (INHALATION)
COMMUNITY

## 2023-09-07 NOTE — PATIENT INSTRUCTIONS
Food Choices for Gastroesophageal Reflux Disease, Adult  When you have gastroesophageal reflux disease (GERD), the foods you eat and your eating habits are very important. Choosing the right foods can help ease your discomfort. Think about working with a food expert (dietitian) to help you make good choices.  What are tips for following this plan?  Reading food labels  Look for foods that are low in saturated fat. Foods that may help with your symptoms include:  Foods that have less than 5% of daily value (DV) of fat.  Foods that have 0 grams of trans fat.  Cooking  Do not drummond your food.  Cook your food by baking, steaming, grilling, or broiling. These are all methods that do not need a lot of fat for cooking.  To add flavor, try to use herbs that are low in spice and acidity.  Meal planning    Choose healthy foods that are low in fat, such as:  Fruits and vegetables.  Whole grains.  Low-fat dairy products.  Lean meats, fish, and poultry.  Eat small meals often instead of eating 3 large meals each day. Eat your meals slowly in a place where you are relaxed. Avoid bending over or lying down until 2-3 hours after eating.  Limit high-fat foods such as fatty meats or fried foods.  Limit your intake of fatty foods, such as oils, butter, and shortening.  Avoid the following as told by your doctor:  Foods that cause symptoms. These may be different for different people. Keep a food diary to keep track of foods that cause symptoms.  Alcohol.  Drinking a lot of liquid with meals.  Eating meals during the 2-3 hours before bed.  Lifestyle  Stay at a healthy weight. Ask your doctor what weight is healthy for you. If you need to lose weight, work with your doctor to do so safely.  Exercise for at least 30 minutes on 5 or more days each week, or as told by your doctor.  Wear loose-fitting clothes.  Do not smoke or use any products that contain nicotine or tobacco. If you need help quitting, ask your doctor.  Sleep with the head  of your bed higher than your feet. Use a wedge under the mattress or blocks under the bed frame to raise the head of the bed.  Chew sugar-free gum after meals.  What foods should eat?    Eat a healthy, well-balanced diet of fruits, vegetables, whole grains, low-fat dairy products, lean meats, fish, and poultry. Each person is different.  Foods that may cause symptoms in one person may not cause any symptoms in another person. Work with your doctor to find foods that are safe for you.  The items listed above may not be a complete list of what you can eat and drink. Contact a food expert for more options.  What foods should I avoid?  Limiting some of these foods may help in managing the symptoms of GERD. Everyone is different. Talk with a food expert or your doctor to help you find the exact foods to avoid, if any.  Fruits  Any fruits prepared with added fat. Any fruits that cause symptoms. For some people, this may include citrus fruits, such as oranges, grapefruit, pineapple, and xavi.  Vegetables  Deep-fried vegetables. French fries. Any vegetables prepared with added fat. Any vegetables that cause symptoms. For some people, this may include tomatoes and tomato products, chili peppers, onions and garlic, and horseradish.  Grains  Pastries or quick breads with added fat.  Meats and other proteins  High-fat meats, such as fatty beef or pork, hot dogs, ribs, ham, sausage, salami, and schuler. Fried meat or protein, including fried fish and fried chicken. Nuts and nut butters, in large amounts.  Dairy  Whole milk and chocolate milk. Sour cream. Cream. Ice cream. Cream cheese. Milkshakes.  Fats and oils  Butter. Margarine. Shortening. Ghee.  Beverages  Coffee and tea, with or without caffeine. Carbonated beverages. Sodas. Energy drinks. Fruit juice made with acidic fruits, such as orange or grapefruit. Tomato juice. Alcoholic drinks.  Sweets and desserts  Chocolate and cocoa. Donuts.  Seasonings and condiments  Pepper.  Peppermint and spearmint. Added salt. Any condiments, herbs, or seasonings that cause symptoms. For some people, this may include vargas, hot sauce, or vinegar-based salad dressings.  The items listed above may not be a complete list of what you should not eat and drink. Contact a food expert for more options.  Questions to ask your doctor  Diet and lifestyle changes are often the first steps that are taken to manage symptoms of GERD. If diet and lifestyle changes do not help, talk with your doctor about taking medicines.  Where to find more information  [employer]: aboutgerd.org  Summary  When you have GERD, food and lifestyle choices are very important in easing your symptoms.  Eat small meals often instead of 3 large meals a day. Eat your meals slowly and in a place where you are relaxed.  Avoid bending over or lying down until 2-3 hours after eating.  Limit high-fat foods such as fatty meats or fried foods.  This information is not intended to replace advice given to you by your health care provider. Make sure you discuss any questions you have with your health care provider.  Document Revised: 06/28/2021 Document Reviewed: 06/28/2021  Elsevier Patient Education © 2023 [employer]

## 2023-09-08 ENCOUNTER — TRANSCRIBE ORDERS (OUTPATIENT)
Dept: CARDIOLOGY | Facility: HOSPITAL | Age: 68
End: 2023-09-08
Payer: MEDICARE

## 2023-09-08 ENCOUNTER — LAB (OUTPATIENT)
Dept: LAB | Facility: HOSPITAL | Age: 68
End: 2023-09-08
Payer: MEDICARE

## 2023-09-08 DIAGNOSIS — E78.5 HYPERLIPIDEMIA, UNSPECIFIED HYPERLIPIDEMIA TYPE: Primary | ICD-10-CM

## 2023-09-08 DIAGNOSIS — E78.5 HYPERLIPIDEMIA, UNSPECIFIED HYPERLIPIDEMIA TYPE: ICD-10-CM

## 2023-09-08 LAB
ALBUMIN SERPL-MCNC: 4.3 G/DL (ref 3.5–5.2)
ALBUMIN/GLOB SERPL: 1.5 G/DL
ALP SERPL-CCNC: 83 U/L (ref 39–117)
ALT SERPL W P-5'-P-CCNC: 21 U/L (ref 1–33)
ANION GAP SERPL CALCULATED.3IONS-SCNC: 10.4 MMOL/L (ref 5–15)
AST SERPL-CCNC: 25 U/L (ref 1–32)
BILIRUB SERPL-MCNC: 0.3 MG/DL (ref 0–1.2)
BUN SERPL-MCNC: 15 MG/DL (ref 8–23)
BUN/CREAT SERPL: 19 (ref 7–25)
CALCIUM SPEC-SCNC: 10 MG/DL (ref 8.6–10.5)
CHLORIDE SERPL-SCNC: 103 MMOL/L (ref 98–107)
CO2 SERPL-SCNC: 27.6 MMOL/L (ref 22–29)
CREAT SERPL-MCNC: 0.79 MG/DL (ref 0.57–1)
EGFRCR SERPLBLD CKD-EPI 2021: 82.1 ML/MIN/1.73
GLOBULIN UR ELPH-MCNC: 2.9 GM/DL
GLUCOSE SERPL-MCNC: 96 MG/DL (ref 65–99)
POTASSIUM SERPL-SCNC: 4.7 MMOL/L (ref 3.5–5.2)
PROT SERPL-MCNC: 7.2 G/DL (ref 6–8.5)
SODIUM SERPL-SCNC: 141 MMOL/L (ref 136–145)

## 2023-09-08 PROCEDURE — 80053 COMPREHEN METABOLIC PANEL: CPT

## 2023-09-08 PROCEDURE — 36415 COLL VENOUS BLD VENIPUNCTURE: CPT

## 2023-11-09 ENCOUNTER — TRANSCRIBE ORDERS (OUTPATIENT)
Dept: ADMINISTRATIVE | Facility: HOSPITAL | Age: 68
End: 2023-11-09
Payer: MEDICARE

## 2023-11-09 ENCOUNTER — LAB (OUTPATIENT)
Dept: LAB | Facility: HOSPITAL | Age: 68
End: 2023-11-09
Payer: MEDICARE

## 2023-11-09 DIAGNOSIS — E78.5 HYPERLIPIDEMIA, UNSPECIFIED HYPERLIPIDEMIA TYPE: Primary | ICD-10-CM

## 2023-11-09 DIAGNOSIS — E78.5 HYPERLIPIDEMIA, UNSPECIFIED HYPERLIPIDEMIA TYPE: ICD-10-CM

## 2023-11-09 LAB
ALBUMIN SERPL-MCNC: 4.2 G/DL (ref 3.5–5.2)
ALBUMIN/GLOB SERPL: 1.6 G/DL
ALP SERPL-CCNC: 98 U/L (ref 39–117)
ALT SERPL W P-5'-P-CCNC: 32 U/L (ref 1–33)
ANION GAP SERPL CALCULATED.3IONS-SCNC: 8.9 MMOL/L (ref 5–15)
AST SERPL-CCNC: 33 U/L (ref 1–32)
BILIRUB SERPL-MCNC: <0.2 MG/DL (ref 0–1.2)
BUN SERPL-MCNC: 16 MG/DL (ref 8–23)
BUN/CREAT SERPL: 19 (ref 7–25)
CALCIUM SPEC-SCNC: 9.6 MG/DL (ref 8.6–10.5)
CHLORIDE SERPL-SCNC: 103 MMOL/L (ref 98–107)
CO2 SERPL-SCNC: 28.1 MMOL/L (ref 22–29)
CREAT SERPL-MCNC: 0.84 MG/DL (ref 0.57–1)
EGFRCR SERPLBLD CKD-EPI 2021: 76.3 ML/MIN/1.73
GLOBULIN UR ELPH-MCNC: 2.7 GM/DL
GLUCOSE SERPL-MCNC: 86 MG/DL (ref 65–99)
POTASSIUM SERPL-SCNC: 4.5 MMOL/L (ref 3.5–5.2)
PROT SERPL-MCNC: 6.9 G/DL (ref 6–8.5)
SODIUM SERPL-SCNC: 140 MMOL/L (ref 136–145)

## 2023-11-09 PROCEDURE — 80053 COMPREHEN METABOLIC PANEL: CPT

## 2023-11-09 PROCEDURE — 36415 COLL VENOUS BLD VENIPUNCTURE: CPT

## 2024-01-02 ENCOUNTER — LAB (OUTPATIENT)
Dept: LAB | Facility: HOSPITAL | Age: 69
End: 2024-01-02
Payer: MEDICARE

## 2024-01-02 ENCOUNTER — TRANSCRIBE ORDERS (OUTPATIENT)
Dept: ADMINISTRATIVE | Facility: HOSPITAL | Age: 69
End: 2024-01-02
Payer: MEDICARE

## 2024-01-02 DIAGNOSIS — E78.5 HYPERLIPIDEMIA, UNSPECIFIED HYPERLIPIDEMIA TYPE: ICD-10-CM

## 2024-01-02 DIAGNOSIS — E78.5 HYPERLIPIDEMIA, UNSPECIFIED HYPERLIPIDEMIA TYPE: Primary | ICD-10-CM

## 2024-01-02 LAB
ALBUMIN SERPL-MCNC: 4.5 G/DL (ref 3.5–5.2)
ALBUMIN/GLOB SERPL: 2.3 G/DL
ALP SERPL-CCNC: 82 U/L (ref 39–117)
ALT SERPL W P-5'-P-CCNC: 28 U/L (ref 1–33)
ANION GAP SERPL CALCULATED.3IONS-SCNC: 9 MMOL/L (ref 5–15)
AST SERPL-CCNC: 27 U/L (ref 1–32)
BILIRUB SERPL-MCNC: 0.3 MG/DL (ref 0–1.2)
BUN SERPL-MCNC: 15 MG/DL (ref 8–23)
BUN/CREAT SERPL: 22.4 (ref 7–25)
CALCIUM SPEC-SCNC: 9.5 MG/DL (ref 8.6–10.5)
CHLORIDE SERPL-SCNC: 107 MMOL/L (ref 98–107)
CHOLEST SERPL-MCNC: 197 MG/DL (ref 0–200)
CO2 SERPL-SCNC: 28 MMOL/L (ref 22–29)
CREAT SERPL-MCNC: 0.67 MG/DL (ref 0.57–1)
EGFRCR SERPLBLD CKD-EPI 2021: 95.3 ML/MIN/1.73
GLOBULIN UR ELPH-MCNC: 2 GM/DL
GLUCOSE SERPL-MCNC: 92 MG/DL (ref 65–99)
HDLC SERPL-MCNC: 67 MG/DL (ref 40–60)
LDLC SERPL CALC-MCNC: 102 MG/DL (ref 0–100)
LDLC/HDLC SERPL: 1.45 {RATIO}
POTASSIUM SERPL-SCNC: 4 MMOL/L (ref 3.5–5.2)
PROT SERPL-MCNC: 6.5 G/DL (ref 6–8.5)
SODIUM SERPL-SCNC: 144 MMOL/L (ref 136–145)
TRIGL SERPL-MCNC: 163 MG/DL (ref 0–150)
VLDLC SERPL-MCNC: 28 MG/DL (ref 5–40)

## 2024-01-02 PROCEDURE — 80053 COMPREHEN METABOLIC PANEL: CPT

## 2024-01-02 PROCEDURE — 80061 LIPID PANEL: CPT

## 2024-01-02 PROCEDURE — 36415 COLL VENOUS BLD VENIPUNCTURE: CPT

## 2024-01-29 ENCOUNTER — TELEPHONE (OUTPATIENT)
Dept: GASTROENTEROLOGY | Facility: CLINIC | Age: 69
End: 2024-01-29
Payer: MEDICARE

## 2024-01-29 DIAGNOSIS — K21.9 GASTROESOPHAGEAL REFLUX DISEASE, UNSPECIFIED WHETHER ESOPHAGITIS PRESENT: ICD-10-CM

## 2024-01-29 RX ORDER — FAMOTIDINE 20 MG/1
20 TABLET, FILM COATED ORAL 2 TIMES DAILY
Qty: 180 TABLET | Refills: 3 | Status: SHIPPED | OUTPATIENT
Start: 2024-01-29

## 2024-01-29 NOTE — TELEPHONE ENCOUNTER
Patient presented to the office requesting refills on Pepcid 20 MG BID. Patient would like this sent to [facility] mail order pharmacy. Patient's new rx card has been scanned into media.

## 2024-03-26 ENCOUNTER — APPOINTMENT (OUTPATIENT)
Dept: GENERAL RADIOLOGY | Facility: HOSPITAL | Age: 69
End: 2024-03-26
Payer: MEDICARE

## 2024-03-26 LAB
ALBUMIN SERPL-MCNC: 4.4 G/DL (ref 3.5–5.2)
ALBUMIN/GLOB SERPL: 1.5 G/DL
ALP SERPL-CCNC: 96 U/L (ref 39–117)
ALT SERPL W P-5'-P-CCNC: 19 U/L (ref 1–33)
ANION GAP SERPL CALCULATED.3IONS-SCNC: 10.3 MMOL/L (ref 5–15)
AST SERPL-CCNC: 22 U/L (ref 1–32)
BASOPHILS # BLD AUTO: 0.05 10*3/MM3 (ref 0–0.2)
BASOPHILS NFR BLD AUTO: 0.7 % (ref 0–1.5)
BILIRUB SERPL-MCNC: 0.2 MG/DL (ref 0–1.2)
BUN SERPL-MCNC: 19 MG/DL (ref 8–23)
BUN/CREAT SERPL: 25.3 (ref 7–25)
CALCIUM SPEC-SCNC: 9.6 MG/DL (ref 8.6–10.5)
CHLORIDE SERPL-SCNC: 104 MMOL/L (ref 98–107)
CO2 SERPL-SCNC: 28.7 MMOL/L (ref 22–29)
CREAT SERPL-MCNC: 0.75 MG/DL (ref 0.57–1)
DEPRECATED RDW RBC AUTO: 41.4 FL (ref 37–54)
EGFRCR SERPLBLD CKD-EPI 2021: 86.8 ML/MIN/1.73
EOSINOPHIL # BLD AUTO: 0.16 10*3/MM3 (ref 0–0.4)
EOSINOPHIL NFR BLD AUTO: 2.3 % (ref 0.3–6.2)
ERYTHROCYTE [DISTWIDTH] IN BLOOD BY AUTOMATED COUNT: 12.6 % (ref 12.3–15.4)
GLOBULIN UR ELPH-MCNC: 3 GM/DL
GLUCOSE SERPL-MCNC: 112 MG/DL (ref 65–99)
HCT VFR BLD AUTO: 43.5 % (ref 34–46.6)
HGB BLD-MCNC: 14 G/DL (ref 12–15.9)
IMM GRANULOCYTES # BLD AUTO: 0.02 10*3/MM3 (ref 0–0.05)
IMM GRANULOCYTES NFR BLD AUTO: 0.3 % (ref 0–0.5)
LYMPHOCYTES # BLD AUTO: 2.14 10*3/MM3 (ref 0.7–3.1)
LYMPHOCYTES NFR BLD AUTO: 30.9 % (ref 19.6–45.3)
MCH RBC QN AUTO: 28.8 PG (ref 26.6–33)
MCHC RBC AUTO-ENTMCNC: 32.2 G/DL (ref 31.5–35.7)
MCV RBC AUTO: 89.5 FL (ref 79–97)
MONOCYTES # BLD AUTO: 0.61 10*3/MM3 (ref 0.1–0.9)
MONOCYTES NFR BLD AUTO: 8.8 % (ref 5–12)
NEUTROPHILS NFR BLD AUTO: 3.95 10*3/MM3 (ref 1.7–7)
NEUTROPHILS NFR BLD AUTO: 57 % (ref 42.7–76)
NRBC BLD AUTO-RTO: 0 /100 WBC (ref 0–0.2)
PLATELET # BLD AUTO: 312 10*3/MM3 (ref 140–450)
PMV BLD AUTO: 9 FL (ref 6–12)
POTASSIUM SERPL-SCNC: 4.2 MMOL/L (ref 3.5–5.2)
PROT SERPL-MCNC: 7.4 G/DL (ref 6–8.5)
RBC # BLD AUTO: 4.86 10*6/MM3 (ref 3.77–5.28)
SODIUM SERPL-SCNC: 143 MMOL/L (ref 136–145)
WBC NRBC COR # BLD AUTO: 6.93 10*3/MM3 (ref 3.4–10.8)

## 2024-03-26 PROCEDURE — 85025 COMPLETE CBC W/AUTO DIFF WBC: CPT

## 2024-03-26 PROCEDURE — 99285 EMERGENCY DEPT VISIT HI MDM: CPT

## 2024-03-26 PROCEDURE — 80053 COMPREHEN METABOLIC PANEL: CPT

## 2024-03-26 PROCEDURE — 96374 THER/PROPH/DIAG INJ IV PUSH: CPT

## 2024-03-26 PROCEDURE — 71045 X-RAY EXAM CHEST 1 VIEW: CPT

## 2024-03-26 PROCEDURE — 36415 COLL VENOUS BLD VENIPUNCTURE: CPT

## 2024-03-26 PROCEDURE — 83690 ASSAY OF LIPASE: CPT | Performed by: EMERGENCY MEDICINE

## 2024-03-26 NOTE — ED TRIAGE NOTES
"Pt comes to the ER tonight for N/V. Pt states that this happens from time to time. Pt states that sometimes when she goes to eat she has a lot of phelm that comes up when she eats. Pt states this has been going on for a couple of years now. Pt states she doesn't feel like there is anything stuck there, she states \"it feels like a bubble.\"  "

## 2024-03-26 NOTE — ED PROVIDER NOTES
Time: 7:48 PM EDT  Date of encounter:  3/26/2024  Independent Historian/Clinical History and Information was obtained by:   Patient    History is limited by: N/A    Chief Complaint   Patient presents with    Vomiting    Nausea         History of Present Illness:  Patient is a 68 y.o. year old female who presents to the emergency department for evaluation of vomiting. States that it is clear. States that when she eats, it is sometimes hard for her to swallow and clear mucus comes out. Patient states that this has been going on and off for quite some time. She has been treated with famotidine in the past.   Patient states he has a history of esophageal stricture that has been treated with dilation.  This was done about 4 years ago.        Patient Care Team  Primary Care Provider: Dilma Carrington MD    Past Medical History:     Allergies   Allergen Reactions    Seasonal Ic [Octacosanol] Unknown - Low Severity    Cephalexin Rash    Latex Rash    Sulfa Antibiotics Rash     Past Medical History:   Diagnosis Date    Asthma     Disease of thyroid gland     Hyperlipidemia     Hypertension     Kidney stone      Past Surgical History:   Procedure Laterality Date    APPENDECTOMY      CATARACT EXTRACTION       SECTION      CHOLECYSTECTOMY      COLONOSCOPY      EXPLORATORY LAPAROTOMY      HYSTERECTOMY      UPPER GASTROINTESTINAL ENDOSCOPY       Family History   Problem Relation Age of Onset    Colon cancer Neg Hx        Home Medications:  Prior to Admission medications    Medication Sig Start Date End Date Taking? Authorizing Provider   albuterol sulfate  (90 Base) MCG/ACT inhaler Ventolin HFA 90 mcg/actuation inhalation HFA aerosol inhaler inhale 1 - 2 puffs by inhalation route every 4-6 hours as needed   Suspended    Provider, MD Flora   aspirin 81 MG chewable tablet Chew 1 tablet Daily.    Provider, MD Flora   azelastine (ASTELIN) 0.1 % nasal spray 2 sprays into the nostril(s) as directed by  provider 2 (Two) Times a Day. 2 puffs each nostril 11/1/23   Denisse Wolf APRN   clarithromycin (BIAXIN) 500 MG tablet Take 1 tablet by mouth Every 12 (Twelve) Hours. 11/1/23   Denisse Wolf APRN   EPINEPHrine (EPIPEN) 0.3 MG/0.3ML solution auto-injector injection AS DIRECTED INJECTION AS NEEDED    Flora Mendoza MD   famotidine (PEPCID) 20 MG tablet Take 1 tablet by mouth 2 (Two) Times a Day. 1/29/24   Layla Dean APRN   fluticasone (FLONASE) 50 MCG/ACT nasal spray 2 sprays into the nostril(s) as directed by provider Daily for 30 days. 2 puffs each nostril 11/1/23 12/1/23  Denisse Wolf APRN   Glycerin-Polysorbate 80 (REFRESH DRY EYE THERAPY OP) Apply  to eye(s) as directed by provider.    Flora Mendoza MD   levothyroxine (SYNTHROID, LEVOTHROID) 125 MCG tablet Take 1 tablet by mouth Daily.    Flora Mendoza MD   lisinopril (PRINIVIL,ZESTRIL) 10 MG tablet Take 1 tablet by mouth Daily.    Flora Mendoza MD   Loratadine (CLARITIN PO) Take  by mouth.    Flora Mendoza MD   montelukast (SINGULAIR) 10 MG tablet Take 1 tablet by mouth Every Night.    Flora Mendoza MD   multivitamin (THERAGRAN) tablet tablet Take 1 tablet by mouth Daily.    Flora Mendoza MD   pravastatin (PRAVACHOL) 40 MG tablet Take 1 tablet by mouth Daily.    Flora Mendoza MD        Social History:   Social History     Tobacco Use    Smoking status: Former     Types: Cigarettes     Passive exposure: Past    Smokeless tobacco: Never   Vaping Use    Vaping status: Never Used   Substance Use Topics    Alcohol use: Yes     Comment: occasionally     Drug use: Never         Review of Systems:  Review of Systems   Constitutional:  Negative for chills and fever.   HENT:  Negative for congestion, ear pain and sore throat.    Eyes:  Negative for pain.   Respiratory:  Negative for cough, chest tightness and shortness of breath.    Cardiovascular:  Negative for chest pain.   Gastrointestinal:  " Positive for nausea and vomiting. Negative for abdominal pain and diarrhea.   Genitourinary:  Negative for flank pain and hematuria.   Musculoskeletal:  Negative for joint swelling.   Skin:  Negative for pallor.   Neurological:  Negative for seizures and headaches.   All other systems reviewed and are negative.       Physical Exam:  /71 (BP Location: Left arm, Patient Position: Sitting)   Pulse 69   Temp 98.3 °F (36.8 °C) (Oral)   Resp 20   Ht 170.2 cm (67\")   Wt 72.6 kg (160 lb)   SpO2 94%   BMI 25.06 kg/m²         Physical Exam  Constitutional:       Appearance: Normal appearance.   HENT:      Head: Normocephalic and atraumatic.      Nose: Nose normal.      Mouth/Throat:      Mouth: Mucous membranes are moist.   Eyes:      Extraocular Movements: Extraocular movements intact.      Conjunctiva/sclera: Conjunctivae normal.      Pupils: Pupils are equal, round, and reactive to light.   Cardiovascular:      Rate and Rhythm: Normal rate and regular rhythm.      Pulses: Normal pulses.      Heart sounds: Normal heart sounds.   Pulmonary:      Effort: Pulmonary effort is normal.      Breath sounds: Normal breath sounds.   Abdominal:      General: There is no distension.      Palpations: Abdomen is soft.      Tenderness: There is no abdominal tenderness.   Musculoskeletal:         General: Normal range of motion.      Cervical back: Normal range of motion.   Skin:     General: Skin is warm and dry.      Capillary Refill: Capillary refill takes less than 2 seconds.   Neurological:      General: No focal deficit present.      Mental Status: She is alert and oriented to person, place, and time. Mental status is at baseline.   Psychiatric:         Mood and Affect: Mood normal.         Behavior: Behavior normal.                      Procedures:  Procedures      Medical Decision Making:      Comorbidities that affect care:    Asthma, Hypertension, Thyroid Disease    External Notes reviewed:    Previous Clinic Note: " Patient was seen by gastroenterologist on 9/7/2023 for reflux.      The following orders were placed and all results were independently analyzed by me:  Orders Placed This Encounter   Procedures    XR Chest 1 View    CT Abdomen Pelvis With Contrast    Comprehensive Metabolic Panel    CBC Auto Differential    Lipase    Ambulatory Referral to Gastroenterology    CBC & Differential       Medications Given in the Emergency Department:  Medications   sodium chloride 0.9 % bolus 1,000 mL (0 mL Intravenous Stopped 3/27/24 0316)   ondansetron (ZOFRAN) injection 4 mg (4 mg Intravenous Given 3/27/24 0135)   iopamidol (ISOVUE-370) 76 % injection 100 mL (100 mL Intravenous Given 3/27/24 0215)        ED Course:    The patient was initially evaluated in the triage area where orders were placed. The patient was later dispositioned by Juliane Green MD.      The patient was advised to stay for completion of workup which includes but is not limited to communication of labs and radiological results, reassessment and plan. The patient was advised that leaving prior to disposition by a provider could result in critical findings that are not communicated to the patient.     ED Course as of 03/27/24 0611   Tue Mar 26, 2024   1951 PROVIDER IN TRIAGE  Patient was evaluated by Cristopher mcgregor PA-C. Orders were placed and awaiting final results and disposition.   [MV]      ED Course User Index  [MV] Cristopher Corrales PA       Labs:    Lab Results (last 24 hours)       Procedure Component Value Units Date/Time    CBC & Differential [[id number]]  (Normal) Collected: 03/26/24 2002    Specimen: Blood from Arm, Right Updated: 03/26/24 2018    Narrative:      The following orders were created for panel order CBC & Differential.  Procedure                               Abnormality         Status                     ---------                               -----------         ------                     CBC Auto Differential[[id number]]        Normal               Final result                 Please view results for these tests on the individual orders.    Comprehensive Metabolic Panel [[id number]]  (Abnormal) Collected: 03/26/24 2002    Specimen: Blood from Arm, Right Updated: 03/26/24 2041     Glucose 112 mg/dL      BUN 19 mg/dL      Creatinine 0.75 mg/dL      Sodium 143 mmol/L      Potassium 4.2 mmol/L      Chloride 104 mmol/L      CO2 28.7 mmol/L      Calcium 9.6 mg/dL      Total Protein 7.4 g/dL      Albumin 4.4 g/dL      ALT (SGPT) 19 U/L      AST (SGOT) 22 U/L      Alkaline Phosphatase 96 U/L      Total Bilirubin 0.2 mg/dL      Globulin 3.0 gm/dL      A/G Ratio 1.5 g/dL      BUN/Creatinine Ratio 25.3     Anion Gap 10.3 mmol/L      eGFR 86.8 mL/min/1.73     Narrative:      GFR Normal >60  Chronic Kidney Disease <60  Kidney Failure <15      CBC Auto Differential [[id number]]  (Normal) Collected: 03/26/24 2002    Specimen: Blood from Arm, Right Updated: 03/26/24 2018     WBC 6.93 10*3/mm3      RBC 4.86 10*6/mm3      Hemoglobin 14.0 g/dL      Hematocrit 43.5 %      MCV 89.5 fL      MCH 28.8 pg      MCHC 32.2 g/dL      RDW 12.6 %      RDW-SD 41.4 fl      MPV 9.0 fL      Platelets 312 10*3/mm3      Neutrophil % 57.0 %      Lymphocyte % 30.9 %      Monocyte % 8.8 %      Eosinophil % 2.3 %      Basophil % 0.7 %      Immature Grans % 0.3 %      Neutrophils, Absolute 3.95 10*3/mm3      Lymphocytes, Absolute 2.14 10*3/mm3      Monocytes, Absolute 0.61 10*3/mm3      Eosinophils, Absolute 0.16 10*3/mm3      Basophils, Absolute 0.05 10*3/mm3      Immature Grans, Absolute 0.02 10*3/mm3      nRBC 0.0 /100 WBC     Lipase [[id number]]  (Normal) Collected: 03/26/24 2002    Specimen: Blood from Arm, Right Updated: 03/27/24 0052     Lipase 47 U/L              Imaging:    CT Abdomen Pelvis With Contrast    Result Date: 3/27/2024  CT ABDOMEN PELVIS W CONTRAST-  Date of Exam: 3/27/2024 2:01 AM  Indication: abdominal pain, nos; +vomiting  Comparison: 6/19/2021.  Technique: Axial CT  images were obtained of the abdomen and pelvis following the uneventful intravenous administration of an unspecified amount of Isovue-370 contrast agent . Reconstructed 2D coronal and sagittal images were also obtained. Automated exposure control and iterative construction methods were used.  Findings: The patient has undergone cholecystectomy, appendectomy, and hysterectomy. There are pelvic phleboliths. No renal stones. No ureterolithiasis. No acute pyelonephritis. No hydronephrosis. No significant interval change is suggested in the 2 cm medial upper pole left renal cyst since the prior 2021 CT study, allowing for technique differences. There are colonic diverticula. No acute diverticulitis. No acute colitis. Formed stool is seen throughout the colon. The maximum diameter of the cecum is about 6.2 cm. There may be a submucosal lipoma at or near the level of the ileocecal valve, probably unchanged since the prior study. It is roughly estimated about 3 cm in diameter, as on image 66 of series 202 and adjacent images. No mechanical bowel obstruction. No pneumoperitoneum or pneumatosis. Degenerative changes involve the imaged spine, especially at L5-S1, with a possible disc herniation. No acute fracture or aggressive osseous lesion is suggested. No urinary bladder calculi are seen. No acute pancreatitis or adrenal nodule. There is a small to moderate sized hiatal hernia. It is thought to be larger in size than on the prior CT exam. It measures approximately 4.5 x 5.1 x 4 cm in transverse, anteroposterior (AP), and craniocaudal extent, respectively, currently. Previously, it measured about 3.3 cm in greatest diameter. Please correlate clinically. There is probably a benign cyst or hemangioma involving the superior liver, measuring about 9 mm, as on image 19 of series 201, probably present previously, as well.      No acute findings are appreciated. Please see above comments for further detail.    Electronically Signed  By-Dennis Bryson MD On:3/27/2024 3:19 AM      XR Chest 1 View    Result Date: 3/26/2024  XR CHEST 1 VW-  Date of Exam: 3/26/2024 8:15 PM  Indication: discomfort  Comparison: None available.  Findings: The heart is not definitely enlarged. The lungs seem clear. There are no pleural effusions.      Impression: 1.  An acute pulmonary process is not apparent.   Electronically Signed By-Chip Phelps MD On:3/26/2024 8:50 PM         Differential Diagnosis and Discussion:      Vomiting: Differential diagnosis includes but is not limited to migraine, labyrinthine disorders, psychogenic, metabolic and endocrine causes, peptic ulcer, gastric outlet obstruction, gastritis, gastroenteritis, appendicitis, intestinal obstruction, paralytic ileus, food poisoning, cholecystitis, acute hepatitis, acute pancreatitis, acute febrile illness, and myocardial infarction.    All labs were reviewed and interpreted by me.  All X-rays impressions were independently interpreted by me.  CT scan radiology impression was interpreted by me.    MDM  Number of Diagnoses or Management Options  Nausea and vomiting, unspecified vomiting type  Diagnosis management comments: Patient is afebrile nontoxic-appearing.  Vital signs stable.  Patient reports nausea and vomiting patient reported mild abdominal pain.  CT of abdomen pelvis showed no acute finding.  Labs show no significant abnormality.  Patient was given antiemetics.  On reevaluation he reported improvement in symptoms.  She is currently tolerating p.o. intake.  With history of esophageal restrictions recommend that she follows up with her gastroenterologist.  Discussed return precautions, discharge instructions and answered all her questions.       Amount and/or Complexity of Data Reviewed  Clinical lab tests: reviewed  Tests in the radiology section of CPT®: reviewed  Review and summarize past medical records: yes  Independent visualization of images, tracings, or specimens: yes    Risk of  Complications, Morbidity, and/or Mortality  Presenting problems: moderate  Management options: moderate                 Patient Care Considerations:    CONSULT: I considered consulting GI , however symptoms improved and patient stable for out patient follow up      Consultants/Shared Management Plan:    None    Social Determinants of Health:    Patient is independent, reliable, and has access to care.       Disposition and Care Coordination:    Discharged: The patient is suitable and stable for discharge with no need for consideration of admission.    I have explained the patient´s condition, diagnoses and treatment plan based on the information available to me at this time. I have answered questions and addressed any concerns. The patient has a good  understanding of the patient´s diagnosis, condition, and treatment plan as can be expected at this point. The vital signs have been stable. The patient´s condition is stable and appropriate for discharge from the emergency department.      The patient will pursue further outpatient evaluation with the primary care physician or other designated or consulting physician as outlined in the discharge instructions. They are agreeable to this plan of care and follow-up instructions have been explained in detail. The patient has received these instructions in written format and has expressed an understanding of the discharge instructions. The patient is aware that any significant change in condition or worsening of symptoms should prompt an immediate return to this or the closest emergency department or call to 911.  I have explained discharge medications and the need for follow up with the patient/caretakers. This was also printed in the discharge instructions. Patient was discharged with the following medications and follow up:      Medication List        New Prescriptions      ondansetron ODT 4 MG disintegrating tablet  Commonly known as: ZOFRAN-ODT  Place 1 tablet on the  tongue Every 8 (Eight) Hours As Needed for Nausea.               Where to Get Your Medications        These medications were sent to [facility]/pharmacy #[id number] - [place], [place] - [address] - [phone]  - [phone] FX  [address] [place] [address] [place] [address]      Hours: 24-hours Phone: [phone]   ondansetron ODT 4 MG disintegrating tablet      Dilma Carrington MD  [address] [place] [id number] [phone]    In 2 days      Rene Bolden MD  [address] [place] [address]  [phone]    Schedule an appointment as soon as possible for a visit          Final diagnoses:   Nausea and vomiting, unspecified vomiting type        ED Disposition       ED Disposition   Discharge    Condition   Stable    Comment   --               This medical record created using voice recognition software.             Juliane Green MD  03/27/24 0609

## 2024-03-27 ENCOUNTER — HOSPITAL ENCOUNTER (EMERGENCY)
Facility: HOSPITAL | Age: 69
Discharge: HOME | End: 2024-03-27
Attending: EMERGENCY MEDICINE | Admitting: EMERGENCY MEDICINE
Payer: MEDICARE

## 2024-03-27 ENCOUNTER — APPOINTMENT (OUTPATIENT)
Dept: CT IMAGING | Facility: HOSPITAL | Age: 69
End: 2024-03-27
Payer: MEDICARE

## 2024-03-27 VITALS
WEIGHT: 160 LBS | DIASTOLIC BLOOD PRESSURE: 71 MMHG | OXYGEN SATURATION: 94 % | TEMPERATURE: 98.3 F | SYSTOLIC BLOOD PRESSURE: 130 MMHG | HEART RATE: 69 BPM | RESPIRATION RATE: 20 BRPM | BODY MASS INDEX: 25.11 KG/M2 | HEIGHT: 67 IN

## 2024-03-27 DIAGNOSIS — R11.2 NAUSEA AND VOMITING, UNSPECIFIED VOMITING TYPE: Primary | ICD-10-CM

## 2024-03-27 LAB — LIPASE SERPL-CCNC: 47 U/L (ref 13–60)

## 2024-03-27 PROCEDURE — 25510000001 IOPAMIDOL PER 1 ML: Performed by: EMERGENCY MEDICINE

## 2024-03-27 PROCEDURE — 25810000003 SODIUM CHLORIDE 0.9 % SOLUTION: Performed by: EMERGENCY MEDICINE

## 2024-03-27 PROCEDURE — 96374 THER/PROPH/DIAG INJ IV PUSH: CPT

## 2024-03-27 PROCEDURE — 25010000002 ONDANSETRON PER 1 MG: Performed by: EMERGENCY MEDICINE

## 2024-03-27 PROCEDURE — 74177 CT ABD & PELVIS W/CONTRAST: CPT

## 2024-03-27 RX ORDER — ONDANSETRON 2 MG/ML
4 INJECTION INTRAMUSCULAR; INTRAVENOUS ONCE
Status: COMPLETED | OUTPATIENT
Start: 2024-03-27 | End: 2024-03-27

## 2024-03-27 RX ORDER — ONDANSETRON 4 MG/1
4 TABLET, ORALLY DISINTEGRATING ORAL EVERY 8 HOURS PRN
Qty: 10 TABLET | Refills: 0 | Status: SHIPPED | OUTPATIENT
Start: 2024-03-27

## 2024-03-27 RX ADMIN — SODIUM CHLORIDE 1000 ML: 9 INJECTION, SOLUTION INTRAVENOUS at 01:34

## 2024-03-27 RX ADMIN — ONDANSETRON 4 MG: 2 INJECTION INTRAMUSCULAR; INTRAVENOUS at 01:35

## 2024-03-27 RX ADMIN — IOPAMIDOL 100 ML: 755 INJECTION, SOLUTION INTRAVENOUS at 02:15

## 2024-04-01 ENCOUNTER — HOSPITAL ENCOUNTER (EMERGENCY)
Facility: HOSPITAL | Age: 69
Discharge: HOME | End: 2024-04-02
Attending: EMERGENCY MEDICINE | Admitting: EMERGENCY MEDICINE
Payer: MEDICARE

## 2024-04-01 DIAGNOSIS — K22.2 ESOPHAGEAL STRICTURE: Primary | ICD-10-CM

## 2024-04-01 LAB
ALBUMIN SERPL-MCNC: 4.4 G/DL (ref 3.5–5.2)
ALBUMIN/GLOB SERPL: 1.5 G/DL
ALP SERPL-CCNC: 88 U/L (ref 39–117)
ALT SERPL W P-5'-P-CCNC: 20 U/L (ref 1–33)
ANION GAP SERPL CALCULATED.3IONS-SCNC: 10.5 MMOL/L (ref 5–15)
AST SERPL-CCNC: 22 U/L (ref 1–32)
BACTERIA UR QL AUTO: ABNORMAL /HPF
BASOPHILS # BLD AUTO: 0.04 10*3/MM3 (ref 0–0.2)
BASOPHILS NFR BLD AUTO: 0.6 % (ref 0–1.5)
BILIRUB SERPL-MCNC: 0.2 MG/DL (ref 0–1.2)
BILIRUB UR QL STRIP: NEGATIVE
BUN SERPL-MCNC: 15 MG/DL (ref 8–23)
BUN/CREAT SERPL: 21.1 (ref 7–25)
CALCIUM SPEC-SCNC: 9.5 MG/DL (ref 8.6–10.5)
CHLORIDE SERPL-SCNC: 104 MMOL/L (ref 98–107)
CLARITY UR: CLEAR
CO2 SERPL-SCNC: 29.5 MMOL/L (ref 22–29)
COLOR UR: YELLOW
CREAT SERPL-MCNC: 0.71 MG/DL (ref 0.57–1)
DEPRECATED RDW RBC AUTO: 42 FL (ref 37–54)
EGFRCR SERPLBLD CKD-EPI 2021: 92.7 ML/MIN/1.73
EOSINOPHIL # BLD AUTO: 0.12 10*3/MM3 (ref 0–0.4)
EOSINOPHIL NFR BLD AUTO: 1.7 % (ref 0.3–6.2)
ERYTHROCYTE [DISTWIDTH] IN BLOOD BY AUTOMATED COUNT: 13.1 % (ref 12.3–15.4)
GLOBULIN UR ELPH-MCNC: 3 GM/DL
GLUCOSE SERPL-MCNC: 119 MG/DL (ref 65–99)
GLUCOSE UR STRIP-MCNC: NEGATIVE MG/DL
HCT VFR BLD AUTO: 40.5 % (ref 34–46.6)
HGB BLD-MCNC: 13.5 G/DL (ref 12–15.9)
HGB UR QL STRIP.AUTO: ABNORMAL
HOLD SPECIMEN: NORMAL
HOLD SPECIMEN: NORMAL
HYALINE CASTS UR QL AUTO: ABNORMAL /LPF
IMM GRANULOCYTES # BLD AUTO: 0.02 10*3/MM3 (ref 0–0.05)
IMM GRANULOCYTES NFR BLD AUTO: 0.3 % (ref 0–0.5)
KETONES UR QL STRIP: ABNORMAL
LEUKOCYTE ESTERASE UR QL STRIP.AUTO: ABNORMAL
LIPASE SERPL-CCNC: 43 U/L (ref 13–60)
LYMPHOCYTES # BLD AUTO: 2 10*3/MM3 (ref 0.7–3.1)
LYMPHOCYTES NFR BLD AUTO: 28.5 % (ref 19.6–45.3)
MCH RBC QN AUTO: 29.4 PG (ref 26.6–33)
MCHC RBC AUTO-ENTMCNC: 33.3 G/DL (ref 31.5–35.7)
MCV RBC AUTO: 88.2 FL (ref 79–97)
MONOCYTES # BLD AUTO: 0.53 10*3/MM3 (ref 0.1–0.9)
MONOCYTES NFR BLD AUTO: 7.6 % (ref 5–12)
NEUTROPHILS NFR BLD AUTO: 4.3 10*3/MM3 (ref 1.7–7)
NEUTROPHILS NFR BLD AUTO: 61.3 % (ref 42.7–76)
NITRITE UR QL STRIP: NEGATIVE
NRBC BLD AUTO-RTO: 0 /100 WBC (ref 0–0.2)
PH UR STRIP.AUTO: 6.5 [PH] (ref 5–8)
PLATELET # BLD AUTO: 304 10*3/MM3 (ref 140–450)
PMV BLD AUTO: 8.9 FL (ref 6–12)
POTASSIUM SERPL-SCNC: 3.4 MMOL/L (ref 3.5–5.2)
PROT SERPL-MCNC: 7.4 G/DL (ref 6–8.5)
PROT UR QL STRIP: NEGATIVE
RBC # BLD AUTO: 4.59 10*6/MM3 (ref 3.77–5.28)
RBC # UR STRIP: ABNORMAL /HPF
REF LAB TEST METHOD: ABNORMAL
SODIUM SERPL-SCNC: 144 MMOL/L (ref 136–145)
SP GR UR STRIP: 1.01 (ref 1–1.03)
SQUAMOUS #/AREA URNS HPF: ABNORMAL /HPF
TROPONIN T SERPL HS-MCNC: 10 NG/L
UROBILINOGEN UR QL STRIP: ABNORMAL
WBC # UR STRIP: ABNORMAL /HPF
WBC NRBC COR # BLD AUTO: 7.01 10*3/MM3 (ref 3.4–10.8)
WHOLE BLOOD HOLD COAG: NORMAL
WHOLE BLOOD HOLD SPECIMEN: NORMAL

## 2024-04-01 PROCEDURE — 93005 ELECTROCARDIOGRAM TRACING: CPT | Performed by: EMERGENCY MEDICINE

## 2024-04-01 PROCEDURE — 84484 ASSAY OF TROPONIN QUANT: CPT

## 2024-04-01 PROCEDURE — 93005 ELECTROCARDIOGRAM TRACING: CPT

## 2024-04-01 PROCEDURE — 80053 COMPREHEN METABOLIC PANEL: CPT

## 2024-04-01 PROCEDURE — 83690 ASSAY OF LIPASE: CPT

## 2024-04-01 PROCEDURE — 85025 COMPLETE CBC W/AUTO DIFF WBC: CPT

## 2024-04-01 PROCEDURE — 81001 URINALYSIS AUTO W/SCOPE: CPT

## 2024-04-01 PROCEDURE — 36415 COLL VENOUS BLD VENIPUNCTURE: CPT

## 2024-04-01 PROCEDURE — 99283 EMERGENCY DEPT VISIT LOW MDM: CPT

## 2024-04-01 RX ORDER — SODIUM CHLORIDE 0.9 % (FLUSH) 0.9 %
10 SYRINGE (ML) INJECTION AS NEEDED
Status: DISCONTINUED | OUTPATIENT
Start: 2024-04-01 | End: 2024-04-02 | Stop reason: HOSPADM

## 2024-04-02 VITALS
SYSTOLIC BLOOD PRESSURE: 167 MMHG | TEMPERATURE: 98 F | HEART RATE: 72 BPM | OXYGEN SATURATION: 94 % | BODY MASS INDEX: 25.06 KG/M2 | DIASTOLIC BLOOD PRESSURE: 85 MMHG | HEIGHT: 67 IN | RESPIRATION RATE: 18 BRPM

## 2024-04-02 PROCEDURE — 25810000003 SODIUM CHLORIDE 0.9 % SOLUTION: Performed by: EMERGENCY MEDICINE

## 2024-04-02 RX ADMIN — SODIUM CHLORIDE 1000 ML: 9 INJECTION, SOLUTION INTRAVENOUS at 02:43

## 2024-04-02 NOTE — ED PROVIDER NOTES
Time: 2:07 AM EDT  Date of encounter:  2024  Independent Historian/Clinical History and Information was obtained by:   Patient  Chief Complaint: Spitting up saliva/phlegm in chest pain    History is limited by: N/A    History of Present Illness:  Patient is a 68 y.o. year old female who presents to the emergency department for evaluation of spitting up phlegm and spit along with chest pain.  Patient she had a similar episode last Tuesday.  Patient is that since then she has had some mild episodes but it got bad today at around 430.  Patient is questioning if it is not related to her Singulair but she states she is taking it for 20 years.  Patient states that today around 4:30 PM she began spitting up a large amount of saliva and phlegm.  Patient also complains of pain at her subxiphoid area.  Patient states it feels like someone is pushing against her.  Patient said the symptoms are intermittent.  Patient states this evening she was trying to drink it was coming back up.  Patient denies any fevers.  She does admit that she has had esophageal dilation in the past.  Patient actually has an appointment with Dr. Bolden's office on Wednesday at 8:15 AM.    HPI    Patient Care Team  Primary Care Provider: Dilma Carrington MD    Past Medical History:     Allergies   Allergen Reactions    Seasonal Ic [Octacosanol] Unknown - Low Severity    Cephalexin Rash    Latex Rash    Sulfa Antibiotics Rash     Past Medical History:   Diagnosis Date    Asthma     Disease of thyroid gland     Hyperlipidemia     Hypertension     Kidney stone      Past Surgical History:   Procedure Laterality Date    APPENDECTOMY      CATARACT EXTRACTION       SECTION      CHOLECYSTECTOMY      COLONOSCOPY      EXPLORATORY LAPAROTOMY      HYSTERECTOMY      UPPER GASTROINTESTINAL ENDOSCOPY       Family History   Problem Relation Age of Onset    Colon cancer Neg Hx        Home Medications:  Prior to Admission medications    Medication Sig  Start Date End Date Taking? Authorizing Provider   albuterol sulfate  (90 Base) MCG/ACT inhaler Ventolin HFA 90 mcg/actuation inhalation HFA aerosol inhaler inhale 1 - 2 puffs by inhalation route every 4-6 hours as needed   Suspended    Flora Mendoza MD   aspirin 81 MG chewable tablet Chew 1 tablet Daily.    Flora Mendoza MD   azelastine (ASTELIN) 0.1 % nasal spray 2 sprays into the nostril(s) as directed by provider 2 (Two) Times a Day. 2 puffs each nostril 11/1/23   Denisse Wolf APRN   clarithromycin (BIAXIN) 500 MG tablet Take 1 tablet by mouth Every 12 (Twelve) Hours. 11/1/23   Denisse Wolf APRN   EPINEPHrine (EPIPEN) 0.3 MG/0.3ML solution auto-injector injection AS DIRECTED INJECTION AS NEEDED    Flora Mendoza MD   famotidine (PEPCID) 20 MG tablet Take 1 tablet by mouth 2 (Two) Times a Day. 1/29/24   Layla Dean APRN   fluticasone (FLONASE) 50 MCG/ACT nasal spray 2 sprays into the nostril(s) as directed by provider Daily for 30 days. 2 puffs each nostril 11/1/23 12/1/23  Denisse Wolf APRN   Glycerin-Polysorbate 80 (REFRESH DRY EYE THERAPY OP) Apply  to eye(s) as directed by provider.    Flora Mendoza MD   levothyroxine (SYNTHROID, LEVOTHROID) 125 MCG tablet Take 1 tablet by mouth Daily.    Flora Mendoza MD   lisinopril (PRINIVIL,ZESTRIL) 10 MG tablet Take 1 tablet by mouth Daily.    Flora Mendoza MD   Loratadine (CLARITIN PO) Take  by mouth.    Flora Mendoza MD   montelukast (SINGULAIR) 10 MG tablet Take 1 tablet by mouth Every Night.    Flora Mendoza MD   multivitamin (THERAGRAN) tablet tablet Take 1 tablet by mouth Daily.    Flora Mendoza MD   ondansetron ODT (ZOFRAN-ODT) 4 MG disintegrating tablet Place 1 tablet on the tongue Every 8 (Eight) Hours As Needed for Nausea. 3/27/24   Juliane Green MD   pravastatin (PRAVACHOL) 40 MG tablet Take 1 tablet by mouth Daily.    Provider, MD Flora        Social  "History:   Social History     Tobacco Use    Smoking status: Former     Types: Cigarettes     Passive exposure: Past    Smokeless tobacco: Never   Vaping Use    Vaping status: Never Used   Substance Use Topics    Alcohol use: Yes     Comment: occasionally     Drug use: Never         Review of Systems:  Review of Systems   Constitutional:  Negative for chills and fever.   HENT:  Positive for trouble swallowing. Negative for congestion, ear pain and sore throat.    Eyes:  Negative for pain.   Respiratory:  Negative for cough, chest tightness and shortness of breath.    Cardiovascular:  Negative for chest pain.   Gastrointestinal:  Negative for abdominal pain, diarrhea, nausea and vomiting.   Genitourinary:  Negative for flank pain and hematuria.   Musculoskeletal:  Negative for joint swelling.   Skin:  Negative for pallor.   Neurological:  Negative for seizures and headaches.   All other systems reviewed and are negative.       Physical Exam:  /84 (BP Location: Left arm, Patient Position: Lying)   Pulse 82   Temp 98 °F (36.7 °C) (Oral)   Resp 18   Ht 170.2 cm (67\")   SpO2 94%   BMI 25.06 kg/m²     Physical Exam    Vital signs were reviewed under triage note.  General appearance - Patient appears well-developed and well-nourished.  Patient is in no acute distress.  Head - Normocephalic, atraumatic.  Pupils - Equal, round, reactive to light.  Extraocular muscles are intact.  Conjunctiva is clear.  Nasal - Normal inspection.  No evidence of trauma or epistaxis.  Tympanic membranes - Gray, intact without erythema or retractions.  Oral mucosa - Pink and moist without lesions or erythema.  Uvula is midline.  Chest wall - Atraumatic.  Chest wall is nontender.  There are no vesicular rashes noted.  Neck - Supple.  Trachea was midline.  There is no palpable lymphadenopathy or thyromegaly.  There are no meningeal signs  Lungs - Clear to auscultation and percussion bilaterally.  Heart - Regular rate and rhythm " without any murmurs, clicks, or gallops.  Abdomen - Soft.  Bowel sounds are present.  There is no palpable tenderness.  There is no rebound, guarding, or rigidity.  There are no palpable masses.  There are no pulsatile masses.  Back - Spine is straight and midline.  There is no CVA tenderness.  Extremities - Intact x4 with full range of motion.  There is no palpable edema.  Pulses are intact x4 and equal.  Neurologic - Patient is awake, alert, and oriented x3.  Cranial nerves II through XII are grossly intact.  Motor and sensory functions grossly intact.  Cerebellar function was normal.  Integument - There are no rashes.  There are no petechia or purpura lesions noted.  There are no vesicular lesions noted.           Procedures:  Procedures      Medical Decision Making:      Comorbidities that affect care:    Asthma, hypertension, kidney stones, hypothyroidism, hyperlipidemia    External Notes reviewed:    Previous Clinic Note: Urgent care note with RIAN Tellez from 11/1/2023 was reviewed by me.      The following orders were placed and all results were independently analyzed by me:  Orders Placed This Encounter   Procedures    [facility] Draw    Comprehensive Metabolic Panel    Lipase    Single High Sensitivity Troponin T    Urinalysis With Microscopic If Indicated (No Culture) - Urine, Clean Catch    CBC Auto Differential    Urinalysis, Microscopic Only - Urine, Clean Catch    NPO Diet NPO Type: Strict NPO    Undress & Gown    Continuous Pulse Oximetry    Vital Signs    Oxygen Therapy- Nasal Cannula; Titrate 1-6 LPM Per SpO2; 90 - 95%    ECG 12 Lead ED Triage Standing Order; Abdominal Pain (Upper)    Insert Peripheral IV    CBC & Differential    Green Top (Gel)    Lavender Top    Gold Top - SST    Light Blue Top       Medications Given in the Emergency Department:  Medications   sodium chloride 0.9 % flush 10 mL (has no administration in time range)        ED Course:    ED Course as of 04/02/24 0217 Tue Apr  02, 2024 0207 EKG performed at 2014 was interpreted by me to show a normal sinus rhythm with a ventricular rate of 68 bpm.  The AL interval is 172 ms.  P waves are normal.  QRS interval is normal.  Axis was at -1 degrees.  There is no acute ischemic ST or T wave change identified.  QT corrected was 436 ms. [TB]      ED Course User Index  [TB] Aris Ignacio DO       The patient was seen and evaluated in the ED by me.  The above history and physical examination was performed as documented.  Diagnostic data was obtained.  Results reviewed.  I also reviewed the ED visit from last Tuesday.  I really feel the patient is most likely having an esophageal stricture.  I was able to give her some water which she was able to drink it was noted that with each sip of water she took she was belching.  But she never vomited.  I do not see the advantage of any additional testing at this time.  She is seeing her GI specialist in approximately 30 hours.  I advised her to be on a clear liquid diet today as I told her any thing of solid substance could cause a esophageal obstruction.  She will need to see her GI doctor on Wednesday morning at 815 and hopefully he will scope her on Thursday or Friday of this week.  Patient is agreeable to this treatment plan.    Labs:    Lab Results (last 24 hours)       Procedure Component Value Units Date/Time    CBC & Differential [[id number]]  (Normal) Collected: 04/01/24 2020    Specimen: Blood from Arm, Right Updated: 04/01/24 2038    Narrative:      The following orders were created for panel order CBC & Differential.  Procedure                               Abnormality         Status                     ---------                               -----------         ------                     CBC Auto Differential[[id number]]        Normal              Final result                 Please view results for these tests on the individual orders.    Comprehensive Metabolic Panel [[id number]]  (Abnormal)  Collected: 04/01/24 2020    Specimen: Blood from Arm, Right Updated: 04/01/24 2102     Glucose 119 mg/dL      BUN 15 mg/dL      Creatinine 0.71 mg/dL      Sodium 144 mmol/L      Potassium 3.4 mmol/L      Chloride 104 mmol/L      CO2 29.5 mmol/L      Calcium 9.5 mg/dL      Total Protein 7.4 g/dL      Albumin 4.4 g/dL      ALT (SGPT) 20 U/L      AST (SGOT) 22 U/L      Alkaline Phosphatase 88 U/L      Total Bilirubin 0.2 mg/dL      Globulin 3.0 gm/dL      A/G Ratio 1.5 g/dL      BUN/Creatinine Ratio 21.1     Anion Gap 10.5 mmol/L      eGFR 92.7 mL/min/1.73     Narrative:      GFR Normal >60  Chronic Kidney Disease <60  Kidney Failure <15      Lipase [[id number]]  (Normal) Collected: 04/01/24 2020    Specimen: Blood from Arm, Right Updated: 04/01/24 2102     Lipase 43 U/L     Single High Sensitivity Troponin T [[id number]]  (Normal) Collected: 04/01/24 2020    Specimen: Blood from Arm, Right Updated: 04/01/24 2102     HS Troponin T 10 ng/L     Narrative:      High Sensitive Troponin T Reference Range:  <14.0 ng/L- Negative Female for AMI  <22.0 ng/L- Negative Male for AMI  >=14 - Abnormal Female indicating possible myocardial injury.  >=22 - Abnormal Male indicating possible myocardial injury.   Clinicians would have to utilize clinical acumen, EKG, Troponin, and serial changes to determine if it is an Acute Myocardial Infarction or myocardial injury due to an underlying chronic condition.         CBC Auto Differential [[id number]]  (Normal) Collected: 04/01/24 2020    Specimen: Blood from Arm, Right Updated: 04/01/24 2038     WBC 7.01 10*3/mm3      RBC 4.59 10*6/mm3      Hemoglobin 13.5 g/dL      Hematocrit 40.5 %      MCV 88.2 fL      MCH 29.4 pg      MCHC 33.3 g/dL      RDW 13.1 %      RDW-SD 42.0 fl      MPV 8.9 fL      Platelets 304 10*3/mm3      Neutrophil % 61.3 %      Lymphocyte % 28.5 %      Monocyte % 7.6 %      Eosinophil % 1.7 %      Basophil % 0.6 %      Immature Grans % 0.3 %      Neutrophils, Absolute 4.30  10*3/mm3      Lymphocytes, Absolute 2.00 10*3/mm3      Monocytes, Absolute 0.53 10*3/mm3      Eosinophils, Absolute 0.12 10*3/mm3      Basophils, Absolute 0.04 10*3/mm3      Immature Grans, Absolute 0.02 10*3/mm3      nRBC 0.0 /100 WBC     Urinalysis With Microscopic If Indicated (No Culture) - Urine, Clean Catch [[id number]]  (Abnormal) Collected: 04/01/24 2040    Specimen: Urine, Clean Catch Updated: 04/01/24 2054     Color, UA Yellow     Appearance, UA Clear     pH, UA 6.5     Specific Gravity, UA 1.012     Glucose, UA Negative     Ketones, UA 15 mg/dL (1+)     Bilirubin, UA Negative     Blood, UA Large (3+)     Protein, UA Negative     Leuk Esterase, UA Moderate (2+)     Nitrite, UA Negative     Urobilinogen, UA 0.2 E.U./dL    Urinalysis, Microscopic Only - Urine, Clean Catch [[id number]]  (Abnormal) Collected: 04/01/24 2040    Specimen: Urine, Clean Catch Updated: 04/01/24 2054     RBC, UA 21-50 /HPF      WBC, UA 21-50 /HPF      Bacteria, UA None Seen /HPF      Squamous Epithelial Cells, UA 0-2 /HPF      Hyaline Casts, UA 3-6 /LPF      Methodology Automated Microscopy             Imaging:    No Radiology Exams Resulted Within Past 24 Hours      Differential Diagnosis and Discussion:    Dysphagia    All labs were reviewed and interpreted by me.  EKG was interpreted by me.    MDM     Amount and/or Complexity of Data Reviewed  Clinical lab tests: reviewed  Tests in the medicine section of CPT®: reviewed             Patient Care Considerations:    CT ABDOMEN AND PELVIS: I considered ordering a CT scan of the abdomen and pelvis however CT scan based on the patient's history and physical is unlikely to uncover the etiology of her dysphagia.      Consultants/Shared Management Plan:    None    Social Determinants of Health:    Patient is independent, reliable, and has access to care.       Disposition and Care Coordination:    Discharged: I considered escalation of care by admitting this patient to the hospital, however  the patient has a GI appointment in approximately 30 hours.  Patient is able to swallow liquids and can be on clear liquid diet to her GI appointment at which point she can be then set up for upper endoscopy.    I have explained the patient´s condition, diagnoses and treatment plan based on the information available to me at this time. I have answered questions and addressed any concerns. The patient has a good  understanding of the patient´s diagnosis, condition, and treatment plan as can be expected at this point. The vital signs have been stable. The patient´s condition is stable and appropriate for discharge from the emergency department.      The patient will pursue further outpatient evaluation with the primary care physician or other designated or consulting physician as outlined in the discharge instructions. They are agreeable to this plan of care and follow-up instructions have been explained in detail. The patient has received these instructions in written format and has expressed an understanding of the discharge instructions. The patient is aware that any significant change in condition or worsening of symptoms should prompt an immediate return to this or the closest emergency department or call to 911.    Final diagnoses:   Esophageal stricture        ED Disposition       ED Disposition   Discharge    Condition   Stable    Comment   --               This medical record created using voice recognition software.             Aris Ignacio DO  04/03/24 [id number]

## 2024-04-02 NOTE — DISCHARGE INSTRUCTIONS
Is imperative that you are on a clear liquid diet today.  Absolutely no food or else you may run the risk of complete esophageal obstruction.  Follow-up with Dr. Bolden at 815 on Wednesday and discuss having a scope done this week.  Return to the ER for change or worsening pain, inability to swallow, repetitive vomiting, or any other concerns issues that may arise.

## 2024-04-03 ENCOUNTER — OFFICE VISIT (OUTPATIENT)
Dept: GASTROENTEROLOGY | Facility: CLINIC | Age: 69
End: 2024-04-03
Payer: MEDICARE

## 2024-04-03 ENCOUNTER — ANESTHESIA (OUTPATIENT)
Dept: GASTROENTEROLOGY | Facility: HOSPITAL | Age: 69
End: 2024-04-03
Payer: MEDICARE

## 2024-04-03 ENCOUNTER — ANESTHESIA EVENT (OUTPATIENT)
Dept: GASTROENTEROLOGY | Facility: HOSPITAL | Age: 69
End: 2024-04-03
Payer: MEDICARE

## 2024-04-03 ENCOUNTER — HOSPITAL ENCOUNTER (OUTPATIENT)
Facility: HOSPITAL | Age: 69
Setting detail: HOSPITAL OUTPATIENT SURGERY
Discharge: HOME | End: 2024-04-03
Attending: INTERNAL MEDICINE | Admitting: INTERNAL MEDICINE
Payer: MEDICARE

## 2024-04-03 VITALS
HEART RATE: 64 BPM | WEIGHT: 179.45 LBS | HEIGHT: 67 IN | OXYGEN SATURATION: 96 % | BODY MASS INDEX: 28.17 KG/M2 | SYSTOLIC BLOOD PRESSURE: 139 MMHG | TEMPERATURE: 98.1 F | RESPIRATION RATE: 14 BRPM | DIASTOLIC BLOOD PRESSURE: 81 MMHG

## 2024-04-03 VITALS
WEIGHT: 178 LBS | HEART RATE: 55 BPM | OXYGEN SATURATION: 98 % | SYSTOLIC BLOOD PRESSURE: 126 MMHG | DIASTOLIC BLOOD PRESSURE: 67 MMHG | BODY MASS INDEX: 27.94 KG/M2 | HEIGHT: 67 IN

## 2024-04-03 DIAGNOSIS — Z87.19 HISTORY OF ESOPHAGEAL STRICTURE: ICD-10-CM

## 2024-04-03 DIAGNOSIS — R11.11 VOMITING WITHOUT NAUSEA, UNSPECIFIED VOMITING TYPE: ICD-10-CM

## 2024-04-03 DIAGNOSIS — R13.10 DYSPHAGIA, UNSPECIFIED TYPE: ICD-10-CM

## 2024-04-03 DIAGNOSIS — R13.10 DYSPHAGIA, UNSPECIFIED TYPE: Primary | ICD-10-CM

## 2024-04-03 PROCEDURE — C1726 CATH, BAL DIL, NON-VASCULAR: HCPCS | Performed by: INTERNAL MEDICINE

## 2024-04-03 PROCEDURE — 25010000002 PROPOFOL 10 MG/ML EMULSION: Performed by: NURSE ANESTHETIST, CERTIFIED REGISTERED

## 2024-04-03 PROCEDURE — 88305 TISSUE EXAM BY PATHOLOGIST: CPT | Performed by: INTERNAL MEDICINE

## 2024-04-03 PROCEDURE — 25810000003 LACTATED RINGERS PER 1000 ML: Performed by: NURSE ANESTHETIST, CERTIFIED REGISTERED

## 2024-04-03 PROCEDURE — 43249 ESOPH EGD DILATION <30 MM: CPT | Performed by: INTERNAL MEDICINE

## 2024-04-03 PROCEDURE — 43239 EGD BIOPSY SINGLE/MULTIPLE: CPT | Performed by: INTERNAL MEDICINE

## 2024-04-03 PROCEDURE — 43251 EGD REMOVE LESION SNARE: CPT | Performed by: INTERNAL MEDICINE

## 2024-04-03 RX ORDER — SODIUM CHLORIDE, SODIUM LACTATE, POTASSIUM CHLORIDE, CALCIUM CHLORIDE 600; 310; 30; 20 MG/100ML; MG/100ML; MG/100ML; MG/100ML
30 INJECTION, SOLUTION INTRAVENOUS CONTINUOUS
Status: DISCONTINUED | OUTPATIENT
Start: 2024-04-03 | End: 2024-04-03 | Stop reason: HOSPADM

## 2024-04-03 RX ORDER — PANTOPRAZOLE SODIUM 40 MG/1
40 TABLET, DELAYED RELEASE ORAL DAILY
Qty: 30 TABLET | Refills: 5 | Status: SHIPPED | OUTPATIENT
Start: 2024-04-03

## 2024-04-03 RX ORDER — FLUTICASONE FUROATE 200 UG/1
POWDER RESPIRATORY (INHALATION)
COMMUNITY
Start: 2024-02-01

## 2024-04-03 RX ORDER — LIDOCAINE HYDROCHLORIDE 20 MG/ML
INJECTION, SOLUTION EPIDURAL; INFILTRATION; INTRACAUDAL; PERINEURAL AS NEEDED
Status: DISCONTINUED | OUTPATIENT
Start: 2024-04-03 | End: 2024-04-03 | Stop reason: SURG

## 2024-04-03 RX ORDER — PROPOFOL 10 MG/ML
VIAL (ML) INTRAVENOUS AS NEEDED
Status: DISCONTINUED | OUTPATIENT
Start: 2024-04-03 | End: 2024-04-03 | Stop reason: SURG

## 2024-04-03 RX ADMIN — PROPOFOL 60 MG: 10 INJECTION, EMULSION INTRAVENOUS at 11:53

## 2024-04-03 RX ADMIN — PROPOFOL 200 MCG/KG/MIN: 10 INJECTION, EMULSION INTRAVENOUS at 11:54

## 2024-04-03 RX ADMIN — LIDOCAINE HYDROCHLORIDE 100 MG: 20 INJECTION, SOLUTION INTRAVENOUS at 11:53

## 2024-04-03 RX ADMIN — SODIUM CHLORIDE, POTASSIUM CHLORIDE, SODIUM LACTATE AND CALCIUM CHLORIDE 30 ML/HR: 600; 310; 30; 20 INJECTION, SOLUTION INTRAVENOUS at 10:58

## 2024-04-03 NOTE — ANESTHESIA PREPROCEDURE EVALUATION
Anesthesia Evaluation     Patient summary reviewed and Nursing notes reviewed   history of anesthetic complications:  prolonged sedation  NPO Solid Status: > 8 hours  NPO Liquid Status: > 2 hours           Airway   Mallampati: I  TM distance: >3 FB  Neck ROM: full  No difficulty expected  Dental - normal exam     Pulmonary - normal exam    breath sounds clear to auscultation  (+) asthma (on inhalers at night - clear and breathing well today),  Cardiovascular - normal exam  Exercise tolerance: good (4-7 METS)    ECG reviewed  Rhythm: regular  Rate: normal    (+) hypertension, hyperlipidemia      Neuro/Psych- negative ROS  GI/Hepatic/Renal/Endo    (+) GERD (previous dilation on EGD in 2020) poorly controlled, renal disease (two ureters on right kidney)- stones, thyroid problem     Musculoskeletal (-) negative ROS    Abdominal    Substance History - negative use     OB/GYN negative ob/gyn ROS         Other - negative ROS       ROS/Med Hx Other: PAT Nursing Notes unavailable.  EKG 4/1/2024:  NSR  HR 68bpm    Echo 3/1/2019:  - EF 60-65%  - Normal left ventricular systolic function with early diastolic dysfunction.    - Normal cardiac dimensions.    - Nonsignificant mitral regurgitation.      Recent ER visit - 4/1/2024  Spitting up large volumes of phlegm and clear fluids                        Anesthesia Plan    ASA 2     general     (Total IV Anesthesia    Patient understands anesthesia not responsible for dental damage.  )  intravenous induction     Anesthetic plan, risks, benefits, and alternatives have been provided, discussed and informed consent has been obtained with: patient.    Plan discussed with CRNA.        CODE STATUS:

## 2024-04-03 NOTE — PROGRESS NOTES
Chief Complaint  Difficulty Swallowing (Moments of vomiting flem will go on for a bit then vomit up food/ 2 times within the last week last 8 hours. )    Mony Echeverria is a 68 y.o. female who presents to [facility] [place] for ER follow-up.    History of present Illness  Patient presents to the office for ER follow-up.  Patient has been evaluated in the ER 2 times in the past month for dysphagia.  Anytime patient eats solid foods it is lodged in her esophagus causing her to vomit up phlegm until solid food comes up.  She has had episodes of dysphagia with liquids.  Patient has had minimal p.o. intake since ER visit on Monday due to fear dysphagia.  Does not currently feel that anything is lodged at this moment.  Patient does have history of esophageal stricture with last dilation performed in .  Denies lower GI symptoms.    CT abdomen/pelvis with contrast 3/26/2024 -colonic diverticula, formed stool throughout colon, maximum diameter of the cecum is 6.2 cm there may be a submucosal lipoma at or near the level of the ileocecal valve unchanged compared to prior study (2021) estimated about 3 cm in diameter, no mechanical bowel obstruction, moderate size hiatal hernia, benign cyst or hemangioma in the liver    EGD/colonoscopy 2020 by Dr. Bolden-benign stricture at GE junction, dilation performed to 18 mm, grade 1 esophagitis, medium hiatal hernia, normal stomach and duodenum.  Esophageal biopsies-slight reactive changes.  Stomach biopsies normal.  Normal mucosa throughout colon with grade 1 internal hemorrhoids.  Repeat colonoscopy in 5 years due to quality of prep.    Past Medical History:   Diagnosis Date    Asthma     Disease of thyroid gland     Hyperlipidemia     Hypertension     Kidney stone        Past Surgical History:   Procedure Laterality Date    APPENDECTOMY      CATARACT EXTRACTION       SECTION      CHOLECYSTECTOMY      COLONOSCOPY       EXPLORATORY LAPAROTOMY      HYSTERECTOMY      UPPER GASTROINTESTINAL ENDOSCOPY           Current Outpatient Medications:     albuterol sulfate  (90 Base) MCG/ACT inhaler, Ventolin HFA 90 mcg/actuation inhalation HFA aerosol inhaler inhale 1 - 2 puffs by inhalation route every 4-6 hours as needed   Suspended, Disp: , Rfl:     Arnuity Ellipta 200 MCG/ACT, , Disp: , Rfl:     aspirin 81 MG chewable tablet, Chew 1 tablet Daily., Disp: , Rfl:     famotidine (PEPCID) 20 MG tablet, Take 1 tablet by mouth 2 (Two) Times a Day., Disp: 180 tablet, Rfl: 3    Glycerin-Polysorbate 80 (REFRESH DRY EYE THERAPY OP), Apply  to eye(s) as directed by provider., Disp: , Rfl:     levothyroxine (SYNTHROID, LEVOTHROID) 125 MCG tablet, Take 1 tablet by mouth Daily., Disp: , Rfl:     lisinopril (PRINIVIL,ZESTRIL) 10 MG tablet, Take 1 tablet by mouth Daily., Disp: , Rfl:     montelukast (SINGULAIR) 10 MG tablet, Take 1 tablet by mouth Every Night., Disp: , Rfl:     multivitamin (THERAGRAN) tablet tablet, Take 1 tablet by mouth Daily., Disp: , Rfl:     ondansetron ODT (ZOFRAN-ODT) 4 MG disintegrating tablet, Place 1 tablet on the tongue Every 8 (Eight) Hours As Needed for Nausea., Disp: 10 tablet, Rfl: 0    pravastatin (PRAVACHOL) 40 MG tablet, Take 1 tablet by mouth Daily., Disp: , Rfl:     azelastine (ASTELIN) 0.1 % nasal spray, 2 sprays into the nostril(s) as directed by provider 2 (Two) Times a Day. 2 puffs each nostril, Disp: 1 each, Rfl: 0    clarithromycin (BIAXIN) 500 MG tablet, Take 1 tablet by mouth Every 12 (Twelve) Hours. (Patient not taking: Reported on 4/3/2024), Disp: 20 tablet, Rfl: 0    EPINEPHrine (EPIPEN) 0.3 MG/0.3ML solution auto-injector injection, AS DIRECTED INJECTION AS NEEDED (Patient not taking: Reported on 4/3/2024), Disp: , Rfl:     fluticasone (FLONASE) 50 MCG/ACT nasal spray, 2 sprays into the nostril(s) as directed by provider Daily for 30 days. 2 puffs each nostril, Disp: 16 g, Rfl: 0    Loratadine  "(CLARITIN PO), Take  by mouth. (Patient not taking: Reported on 4/3/2024), Disp: , Rfl:      Allergies   Allergen Reactions    Seasonal Ic [Octacosanol] Unknown - Low Severity    Cephalexin Rash    Latex Rash    Sulfa Antibiotics Rash       Family History   Problem Relation Age of Onset    Colon cancer Neg Hx         Social History     Social History Narrative    Not on file       Objective       Vital Signs:   /67 (BP Location: Right arm, Patient Position: Sitting, Cuff Size: Adult)   Pulse 55   Ht 170.2 cm (67\")   Wt 80.7 kg (178 lb)   SpO2 98%   BMI 27.88 kg/m²       Physical Exam  Constitutional:       Appearance: Normal appearance. She is normal weight.   HENT:      Head: Normocephalic and atraumatic.      Nose: Nose normal.   Pulmonary:      Effort: Pulmonary effort is normal.   Skin:     General: Skin is warm and dry.   Neurological:      Mental Status: She is alert and oriented to person, place, and time. Mental status is at baseline.   Psychiatric:         Mood and Affect: Mood normal.         Behavior: Behavior normal.         Thought Content: Thought content normal.         Judgment: Judgment normal.         Result Review :       CBC w/diff          3/26/2024    20:02 4/1/2024    20:20   CBC w/Diff   WBC 6.93  7.01    RBC 4.86  4.59    Hemoglobin 14.0  13.5    Hematocrit 43.5  40.5    MCV 89.5  88.2    MCH 28.8  29.4    MCHC 32.2  33.3    RDW 12.6  13.1    Platelets 312  304    Neutrophil Rel % 57.0  61.3    Immature Granulocyte Rel % 0.3  0.3    Lymphocyte Rel % 30.9  28.5    Monocyte Rel % 8.8  7.6    Eosinophil Rel % 2.3  1.7    Basophil Rel % 0.7  0.6      CMP          1/2/2024    07:53 3/26/2024    20:02 4/1/2024    20:20   CMP   Glucose 92  112  119    BUN 15  19  15    Creatinine 0.67  0.75  0.71    EGFR 95.3  86.8  92.7    Sodium 144  143  144    Potassium 4.0  4.2  3.4    Chloride 107  104  104    Calcium 9.5  9.6  9.5    Total Protein 6.5  7.4  7.4    Albumin 4.5  4.4  4.4    Globulin " 2.0  3.0  3.0    Total Bilirubin 0.3  0.2  0.2    Alkaline Phosphatase 82  96  88    AST (SGOT) 27  22  22    ALT (SGPT) 28  19  20    Albumin/Globulin Ratio 2.3  1.5  1.5    BUN/Creatinine Ratio 22.4  25.3  21.1    Anion Gap 9.0  10.3  10.5              Assessment and Plan    Diagnoses and all orders for this visit:    1. Dysphagia, unspecified type (Primary)  -     Case Request; Standing  -     Follow Anesthesia Guidelines / Protocol; Standing  -     Verify NPO; Standing  -     Obtain Informed Consent; Standing  -     Case Request    2. Vomiting without nausea, unspecified vomiting type  -     Case Request; Standing  -     Follow Anesthesia Guidelines / Protocol; Standing  -     Verify NPO; Standing  -     Obtain Informed Consent; Standing  -     Case Request    3. History of esophageal stricture  -     Case Request; Standing  -     Follow Anesthesia Guidelines / Protocol; Standing  -     Verify NPO; Standing  -     Obtain Informed Consent; Standing  -     Case Request    68 year old patient presents to the office for ER follow-up.  Patient has been evaluated in the ER 2 times in the past month for dysphagia.  Anytime patient eats solid foods it is lodged in her esophagus causing her to vomit up phlegm until solid food comes up.  She has had episodes of dysphagia with liquids.  Patient has had minimal p.o. intake since ER visit on Monday due to fear dysphagia.  Does not currently feel that anything is lodged at this moment.  Patient does have history of esophageal stricture with last dilation performed in 2020.  We will plan to proceed with urgent EGD today with Dr. White.  Denies cardiopulmonary history.  Verified NPO time was yesterday.  She is accompanied by  he will be able to drive patient home after procedure.  Patient is agreeable to plan will call the office any questions or concerns.    EGD Surgical Risk and Benefits: Possible risk/complications, benefits, and alternatives to surgical or invasive  procedure have been explained to patient and/or legal guardian. Risks include bleeding, infection, and perforation. Patient has been evaluated and can tolerate anesthesia and/or sedation. Risk, benefits, and alternatives to anesthesia and sedation have been explained to patient and/or legal guardian.     Follow Up   No follow-ups on file.  Patient was given instructions and counseling regarding her condition or for health maintenance advice. Please see specific information pulled into the AVS if appropriate.

## 2024-04-03 NOTE — ANESTHESIA POSTPROCEDURE EVALUATION
Patient: Mony Echeverria    Procedure Summary       Date: 04/03/24 Room / Location: [facility] ENDOSCOPY 4 / [facility]    Anesthesia Start: 1150 Anesthesia Stop: 1220    Procedure: ESOPHAGOGASTRODUODENOSCOPY WITH BIOPSIES, POLYPECTOMY, BALLOON DILATATION 15-18 Diagnosis:       Dysphagia, unspecified type      Vomiting without nausea, unspecified vomiting type      History of esophageal stricture      (Dysphagia, unspecified type [R13.10])      (Vomiting without nausea, unspecified vomiting type [R11.11])      (History of esophageal stricture [Z87.19])    Surgeons: Crispin White MD Provider: Glo Carranza CRNA    Anesthesia Type: general ASA Status: 2            Anesthesia Type: general    Vitals  Vitals Value Taken Time   /81 04/03/24 1235   Temp 36.7 °C (98.1 °F) 04/03/24 1234   Pulse 58 04/03/24 1236   Resp 14 04/03/24 1234   SpO2 94 % 04/03/24 1236   Vitals shown include unfiled device data.        Post Anesthesia Care and Evaluation    Post-procedure mental status: acceptable.  Pain management: satisfactory to patient    Airway patency: patent  Anesthetic complications: No anesthetic complications    Cardiovascular status: acceptable  Respiratory status: acceptable    Comments: Per chart review

## 2024-04-03 NOTE — H&P
Pre Procedure History & Physical    Chief Complaint:   Dysphagia    Subjective     HPI:   Dysphagia    Past Medical History:   Past Medical History:   Diagnosis Date    Asthma     Disease of thyroid gland     Hyperlipidemia     Hypertension     Kidney stone        Past Surgical History:  Past Surgical History:   Procedure Laterality Date    APPENDECTOMY      CATARACT EXTRACTION       SECTION      CHOLECYSTECTOMY      COLONOSCOPY      EXPLORATORY LAPAROTOMY      HYSTERECTOMY      UPPER GASTROINTESTINAL ENDOSCOPY         Family History:  Family History   Problem Relation Age of Onset    Colon cancer Neg Hx        Social History:   reports that she has quit smoking. Her smoking use included cigarettes. She has been exposed to tobacco smoke. She has never used smokeless tobacco. She reports current alcohol use. She reports that she does not use drugs.    Medications:   Medications Prior to Admission   Medication Sig Dispense Refill Last Dose    Arnuity Ellipta 200 MCG/ACT    2024    aspirin 81 MG chewable tablet Chew 1 tablet Daily.   2024    famotidine (PEPCID) 20 MG tablet Take 1 tablet by mouth 2 (Two) Times a Day. 180 tablet 3 2024    Glycerin-Polysorbate 80 (REFRESH DRY EYE THERAPY OP) Apply  to eye(s) as directed by provider.   Past Month    levothyroxine (SYNTHROID, LEVOTHROID) 125 MCG tablet Take 1 tablet by mouth Daily.   2024    lisinopril (PRINIVIL,ZESTRIL) 10 MG tablet Take 1 tablet by mouth Daily.   2024    montelukast (SINGULAIR) 10 MG tablet Take 1 tablet by mouth Every Night.   2024    multivitamin (THERAGRAN) tablet tablet Take 1 tablet by mouth Daily.   2024    ondansetron ODT (ZOFRAN-ODT) 4 MG disintegrating tablet Place 1 tablet on the tongue Every 8 (Eight) Hours As Needed for Nausea. 10 tablet 0 Past Week    pravastatin (PRAVACHOL) 40 MG tablet Take 1 tablet by mouth Daily.   2024    albuterol sulfate  (90 Base) MCG/ACT inhaler Ventolin HFA 90  "mcg/actuation inhalation HFA aerosol inhaler inhale 1 - 2 puffs by inhalation route every 4-6 hours as needed   Suspended   More than a month    azelastine (ASTELIN) 0.1 % nasal spray 2 sprays into the nostril(s) as directed by provider 2 (Two) Times a Day. 2 puffs each nostril 1 each 0 Unknown    clarithromycin (BIAXIN) 500 MG tablet Take 1 tablet by mouth Every 12 (Twelve) Hours. (Patient not taking: Reported on 4/3/2024) 20 tablet 0     EPINEPHrine (EPIPEN) 0.3 MG/0.3ML solution auto-injector injection AS DIRECTED INJECTION AS NEEDED (Patient not taking: Reported on 4/3/2024)       fluticasone (FLONASE) 50 MCG/ACT nasal spray 2 sprays into the nostril(s) as directed by provider Daily for 30 days. 2 puffs each nostril 16 g 0     Loratadine (CLARITIN PO) Take  by mouth. (Patient not taking: Reported on 4/3/2024)          Allergies:  Seasonal ic [octacosanol], Cephalexin, Latex, and Sulfa antibiotics        Objective     Blood pressure (!) 181/70, pulse 53, temperature 97.3 °F (36.3 °C), temperature source Temporal, resp. rate 16, height 170.2 cm (67\"), weight 81.4 kg (179 lb 7.3 oz), SpO2 95%.    Physical Exam   Constitutional: Pt is oriented to person, place, and time and well-developed, well-nourished, and in no distress.   Mouth/Throat: Oropharynx is clear and moist.   Neck: Normal range of motion.   Cardiovascular: Normal rate, regular rhythm and normal heart sounds.    Pulmonary/Chest: Effort normal and breath sounds normal.   Abdominal: Soft. Nontender  Skin: Skin is warm and dry.   Psychiatric: Mood, memory, affect and judgment normal.     Assessment & Plan     Diagnosis:  Dysphagia    Anticipated Surgical Procedure:  EGD    The risks, benefits, and alternatives of this procedure have been discussed with the patient or the responsible party- the patient understands and agrees to proceed.            "

## 2024-04-04 LAB
CYTO UR: NORMAL
LAB AP CASE REPORT: NORMAL
LAB AP CLINICAL INFORMATION: NORMAL
PATH REPORT.FINAL DX SPEC: NORMAL
PATH REPORT.GROSS SPEC: NORMAL
QT INTERVAL: 408 MS
QTC INTERVAL: 436 MS

## 2024-04-16 ENCOUNTER — OFFICE VISIT (OUTPATIENT)
Dept: INTERNAL MEDICINE | Facility: CLINIC | Age: 69
End: 2024-04-16
Payer: MEDICARE

## 2024-04-16 VITALS
OXYGEN SATURATION: 98 % | HEIGHT: 67 IN | HEART RATE: 67 BPM | RESPIRATION RATE: 16 BRPM | WEIGHT: 180.8 LBS | BODY MASS INDEX: 28.38 KG/M2 | SYSTOLIC BLOOD PRESSURE: 130 MMHG | TEMPERATURE: 96.7 F | DIASTOLIC BLOOD PRESSURE: 70 MMHG

## 2024-04-16 DIAGNOSIS — I10 PRIMARY HYPERTENSION: ICD-10-CM

## 2024-04-16 DIAGNOSIS — Z78.0 POSTMENOPAUSAL: ICD-10-CM

## 2024-04-16 DIAGNOSIS — J45.40 MODERATE PERSISTENT ASTHMA, UNSPECIFIED WHETHER COMPLICATED: ICD-10-CM

## 2024-04-16 DIAGNOSIS — E78.49 OTHER HYPERLIPIDEMIA: ICD-10-CM

## 2024-04-16 DIAGNOSIS — Z87.19 HISTORY OF ESOPHAGEAL STRICTURE: ICD-10-CM

## 2024-04-16 DIAGNOSIS — E03.9 HYPOTHYROIDISM, UNSPECIFIED TYPE: ICD-10-CM

## 2024-04-16 DIAGNOSIS — Z76.89 ESTABLISHING CARE WITH NEW DOCTOR, ENCOUNTER FOR: Primary | ICD-10-CM

## 2024-04-16 DIAGNOSIS — Z12.31 BREAST CANCER SCREENING BY MAMMOGRAM: ICD-10-CM

## 2024-04-16 DIAGNOSIS — E66.3 OVERWEIGHT WITH BODY MASS INDEX (BMI) OF 25 TO 25.9 IN ADULT: ICD-10-CM

## 2024-04-16 DIAGNOSIS — J30.2 SEASONAL ALLERGIC RHINITIS, UNSPECIFIED TRIGGER: ICD-10-CM

## 2024-04-16 PROBLEM — R31.29 HEMATURIA, MICROSCOPIC: Status: ACTIVE | Noted: 2024-04-16

## 2024-04-16 PROBLEM — J45.909 ASTHMA: Status: ACTIVE | Noted: 2024-04-16

## 2024-04-16 PROCEDURE — G2211 COMPLEX E/M VISIT ADD ON: HCPCS | Performed by: NURSE PRACTITIONER

## 2024-04-16 PROCEDURE — 3075F SYST BP GE 130 - 139MM HG: CPT | Performed by: NURSE PRACTITIONER

## 2024-04-16 PROCEDURE — 99205 OFFICE O/P NEW HI 60 MIN: CPT | Performed by: NURSE PRACTITIONER

## 2024-04-16 PROCEDURE — 3078F DIAST BP <80 MM HG: CPT | Performed by: NURSE PRACTITIONER

## 2024-04-16 RX ORDER — PRAVASTATIN SODIUM 40 MG
40 TABLET ORAL DAILY
Qty: 90 TABLET | Refills: 1 | Status: SHIPPED | OUTPATIENT
Start: 2024-04-16

## 2024-04-16 RX ORDER — LISINOPRIL 10 MG/1
10 TABLET ORAL DAILY
Qty: 90 TABLET | Refills: 1 | Status: SHIPPED | OUTPATIENT
Start: 2024-04-16

## 2024-04-16 NOTE — ASSESSMENT & PLAN NOTE
Patient's (Body mass index is 28.31 kg/m².) indicates that they are overweight with health conditions that include hypertension . Weight is newly identified. BMI is is above average; BMI management plan is completed. We discussed portion control and increasing exercise.

## 2024-04-16 NOTE — PROGRESS NOTES
"Chief Complaint  Establish Care, Asthma, Hypertension, Heartburn, Hyperlipidemia, and Medication Review     Subjective        Mony Echeverria presents to [facility] for establishment of care.  Patient has been going to her primary care provider previously for 23 years, they recently close their office, which requires her to establish with new primary care provider.    Hypertension, longstanding, on lisinopril 10 mg daily, takes with good compliance, numbers are looking good.  Labs are up-to-date.    Hypothyroidism, unclear etiology, likely Hashimoto's thyroiditis, on levothyroxine, no doses changes in several years, labs up-to-date, reviewed today.    Hyperlipidemia, on statin therapy, most recent labs reviewed, LDL mildly elevated, total cholesterol normal, HDL on the high side.  Will be due for labs and July.    Asthma, sees a pulmonologist, has maintenance inhaler, is on Singulair, has rescue inhaler to use as needed.  Follows with them regularly.  They do manage her medications.    Reflux, esophageal stricture, has had esophageal dilation x 2, initially in 2020, and then repeated just recently.  Was having significant dysphagia.  She was switched from famotidine to pantoprazole as well, taking with good compliance thus far.  She follows up with gastroenterology in 6 months.    Objective   Vital Signs:   /70 (BP Location: Left arm, Patient Position: Sitting, Cuff Size: Adult)   Pulse 67   Temp 96.7 °F (35.9 °C) (Temporal)   Resp 16   Ht 170.2 cm (67.01\")   Wt 82 kg (180 lb 12.8 oz)   SpO2 98%   BMI 28.31 kg/m²     Physical Exam  Vitals and nursing note reviewed.   Constitutional:       Appearance: Normal appearance.   HENT:      Head: Normocephalic and atraumatic.      Right Ear: External ear normal.      Left Ear: External ear normal.   Cardiovascular:      Rate and Rhythm: Normal rate.   Pulmonary:      Effort: Pulmonary effort is normal.   Neurological:      Mental " Status: She is alert.   Psychiatric:         Mood and Affect: Mood normal.         Thought Content: Thought content normal.        Result Review :  {The following data was reviewed by RIAN Lindquist on 04/16/24                Diagnoses and all orders for this visit:    1. Establishing care with new doctor, encounter for (Primary)    2. Primary hypertension  -     lisinopril (PRINIVIL,ZESTRIL) 10 MG tablet; Take 1 tablet by mouth Daily.  Dispense: 90 tablet; Refill: 1    3. Hypothyroidism, unspecified type    4. Other hyperlipidemia  -     pravastatin (PRAVACHOL) 40 MG tablet; Take 1 tablet by mouth Daily.  Dispense: 90 tablet; Refill: 1    5. History of esophageal stricture    6. Moderate persistent asthma, unspecified whether complicated    7. Seasonal allergic rhinitis, unspecified trigger    8. Overweight with body mass index (BMI) of 25 to 25.9 in adult  Assessment & Plan:  Patient's (Body mass index is 28.31 kg/m².) indicates that they are overweight with health conditions that include hypertension . Weight is newly identified. BMI is is above average; BMI management plan is completed. We discussed portion control and increasing exercise.       9. Postmenopausal  -     DEXA Bone Density Axial    10. Breast cancer screening by mammogram  -     Mammo Screening Digital Tomosynthesis Bilateral With CAD; Future    Reviewed patient's medical records today, I do not have any from her PCP, she had been going there for 23 years, we did request records, will review when available.  Most of her labs have been completed through [facility], so they are able to be viewed, I have gone back for the last several years, no significant concerns that I can see currently.  Reviewed her most recent hospitalization and procedure with gastroenterology, reviewed visit notes, and ER notes previous to the procedure, discussed her PPI, discussed risk, benefits, side effects, encouraged to maintain that until seen in follow-up by  gastroenterology.    We did address a couple of health maintenance topics, due for mammogram, ordered today.  DEXA scan, ordered today.  She believes she has had both pneumococcal pneumonia vaccines, she will verify with her pharmacy, if not, she will obtain the new Prevnar 20.    She will be due wellness visit in July, will complete that in July at her follow-up, will obtain lab work at that time as well.  Medications that were needing refill, refill today.  Colon cancer screening is up-to-date, she will be due in 2025    I spent 62 minutes caring for Mony on this date of service. This time includes time spent by me in the following activities:preparing for the visit, reviewing tests, obtaining and/or reviewing a separately obtained history, performing a medically appropriate examination and/or evaluation , counseling and educating the patient/family/caregiver, ordering medications, tests, or procedures, documenting information in the medical record, and care coordination  Follow Up   Return in about 3 months (around 7/16/2024) for Recheck, Medicare Wellness.  Patient was given instructions and counseling regarding her condition or for health maintenance advice. Please see specific information pulled into the AVS if appropriate.     Jason Holden, APRN  4/16/2024  This note was electronically signed.

## 2024-04-18 ENCOUNTER — PATIENT ROUNDING (BHMG ONLY) (OUTPATIENT)
Dept: INTERNAL MEDICINE | Facility: CLINIC | Age: 69
End: 2024-04-18
Payer: MEDICARE

## 2024-04-18 NOTE — PROGRESS NOTES
A My-Chart message has been sent to the patient for PATIENT ROUNDING with [facility] [place].

## 2024-05-03 ENCOUNTER — HOSPITAL ENCOUNTER (OUTPATIENT)
Dept: BONE DENSITY | Facility: HOSPITAL | Age: 69
Discharge: HOME | End: 2024-05-03
Payer: MEDICARE

## 2024-05-03 ENCOUNTER — HOSPITAL ENCOUNTER (OUTPATIENT)
Dept: MAMMOGRAPHY | Facility: HOSPITAL | Age: 69
Discharge: HOME | End: 2024-05-03
Payer: MEDICARE

## 2024-05-03 DIAGNOSIS — Z12.31 BREAST CANCER SCREENING BY MAMMOGRAM: ICD-10-CM

## 2024-05-03 PROCEDURE — 77063 BREAST TOMOSYNTHESIS BI: CPT

## 2024-05-03 PROCEDURE — 77067 SCR MAMMO BI INCL CAD: CPT

## 2024-05-03 PROCEDURE — 77080 DXA BONE DENSITY AXIAL: CPT

## 2024-07-16 ENCOUNTER — OFFICE VISIT (OUTPATIENT)
Dept: INTERNAL MEDICINE | Facility: CLINIC | Age: 69
End: 2024-07-16
Payer: MEDICARE

## 2024-07-16 VITALS
HEIGHT: 67 IN | BODY MASS INDEX: 28.12 KG/M2 | RESPIRATION RATE: 14 BRPM | SYSTOLIC BLOOD PRESSURE: 116 MMHG | OXYGEN SATURATION: 97 % | DIASTOLIC BLOOD PRESSURE: 76 MMHG | HEART RATE: 58 BPM | TEMPERATURE: 96.6 F | WEIGHT: 179.2 LBS

## 2024-07-16 DIAGNOSIS — R13.10 DYSPHAGIA, UNSPECIFIED TYPE: ICD-10-CM

## 2024-07-16 DIAGNOSIS — I10 PRIMARY HYPERTENSION: ICD-10-CM

## 2024-07-16 DIAGNOSIS — Z00.00 ENCOUNTER FOR ANNUAL WELLNESS VISIT (AWV) IN MEDICARE PATIENT: Primary | ICD-10-CM

## 2024-07-16 DIAGNOSIS — E78.49 OTHER HYPERLIPIDEMIA: ICD-10-CM

## 2024-07-16 DIAGNOSIS — M85.89 OSTEOPENIA OF MULTIPLE SITES: ICD-10-CM

## 2024-07-16 DIAGNOSIS — K21.9 GASTROESOPHAGEAL REFLUX DISEASE WITHOUT ESOPHAGITIS: ICD-10-CM

## 2024-07-16 DIAGNOSIS — J30.2 SEASONAL ALLERGIC RHINITIS, UNSPECIFIED TRIGGER: ICD-10-CM

## 2024-07-16 DIAGNOSIS — J45.40 MODERATE PERSISTENT ASTHMA, UNSPECIFIED WHETHER COMPLICATED: ICD-10-CM

## 2024-07-16 DIAGNOSIS — E03.9 HYPOTHYROIDISM, UNSPECIFIED TYPE: ICD-10-CM

## 2024-07-16 LAB
ALBUMIN SERPL-MCNC: 4.5 G/DL (ref 3.5–5.2)
ALBUMIN/GLOB SERPL: 1.5 G/DL
ALP SERPL-CCNC: 83 U/L (ref 39–117)
ALT SERPL W P-5'-P-CCNC: 22 U/L (ref 1–33)
ANION GAP SERPL CALCULATED.3IONS-SCNC: 11.2 MMOL/L (ref 5–15)
AST SERPL-CCNC: 28 U/L (ref 1–32)
BASOPHILS # BLD AUTO: 0.04 10*3/MM3 (ref 0–0.2)
BASOPHILS NFR BLD AUTO: 0.6 % (ref 0–1.5)
BILIRUB SERPL-MCNC: 0.2 MG/DL (ref 0–1.2)
BUN SERPL-MCNC: 17 MG/DL (ref 8–23)
BUN/CREAT SERPL: 23 (ref 7–25)
CALCIUM SPEC-SCNC: 9.9 MG/DL (ref 8.6–10.5)
CHLORIDE SERPL-SCNC: 103 MMOL/L (ref 98–107)
CHOLEST SERPL-MCNC: 198 MG/DL (ref 0–200)
CO2 SERPL-SCNC: 27.8 MMOL/L (ref 22–29)
CREAT SERPL-MCNC: 0.74 MG/DL (ref 0.57–1)
DEPRECATED RDW RBC AUTO: 40.8 FL (ref 37–54)
EGFRCR SERPLBLD CKD-EPI 2021: 88.3 ML/MIN/1.73
EOSINOPHIL # BLD AUTO: 0.08 10*3/MM3 (ref 0–0.4)
EOSINOPHIL NFR BLD AUTO: 1.1 % (ref 0.3–6.2)
ERYTHROCYTE [DISTWIDTH] IN BLOOD BY AUTOMATED COUNT: 12.7 % (ref 12.3–15.4)
GLOBULIN UR ELPH-MCNC: 3 GM/DL
GLUCOSE SERPL-MCNC: 90 MG/DL (ref 65–99)
HCT VFR BLD AUTO: 39.6 % (ref 34–46.6)
HDLC SERPL-MCNC: 73 MG/DL (ref 40–60)
HGB BLD-MCNC: 13.1 G/DL (ref 12–15.9)
IMM GRANULOCYTES # BLD AUTO: 0.02 10*3/MM3 (ref 0–0.05)
IMM GRANULOCYTES NFR BLD AUTO: 0.3 % (ref 0–0.5)
LDLC SERPL CALC-MCNC: 110 MG/DL (ref 0–100)
LDLC/HDLC SERPL: 1.48 {RATIO}
LYMPHOCYTES # BLD AUTO: 2.29 10*3/MM3 (ref 0.7–3.1)
LYMPHOCYTES NFR BLD AUTO: 32.3 % (ref 19.6–45.3)
MCH RBC QN AUTO: 29.4 PG (ref 26.6–33)
MCHC RBC AUTO-ENTMCNC: 33.1 G/DL (ref 31.5–35.7)
MCV RBC AUTO: 89 FL (ref 79–97)
MONOCYTES # BLD AUTO: 0.48 10*3/MM3 (ref 0.1–0.9)
MONOCYTES NFR BLD AUTO: 6.8 % (ref 5–12)
NEUTROPHILS NFR BLD AUTO: 4.18 10*3/MM3 (ref 1.7–7)
NEUTROPHILS NFR BLD AUTO: 58.9 % (ref 42.7–76)
NRBC BLD AUTO-RTO: 0 /100 WBC (ref 0–0.2)
PLATELET # BLD AUTO: 324 10*3/MM3 (ref 140–450)
PMV BLD AUTO: 9.8 FL (ref 6–12)
POTASSIUM SERPL-SCNC: 4.1 MMOL/L (ref 3.5–5.2)
PROT SERPL-MCNC: 7.5 G/DL (ref 6–8.5)
RBC # BLD AUTO: 4.45 10*6/MM3 (ref 3.77–5.28)
SODIUM SERPL-SCNC: 142 MMOL/L (ref 136–145)
T3FREE SERPL-MCNC: 2.29 PG/ML (ref 2–4.4)
T4 FREE SERPL-MCNC: 1.54 NG/DL (ref 0.92–1.68)
TRIGL SERPL-MCNC: 84 MG/DL (ref 0–150)
TSH SERPL DL<=0.05 MIU/L-ACNC: 1.13 UIU/ML (ref 0.27–4.2)
VLDLC SERPL-MCNC: 15 MG/DL (ref 5–40)
WBC NRBC COR # BLD AUTO: 7.09 10*3/MM3 (ref 3.4–10.8)

## 2024-07-16 PROCEDURE — 3074F SYST BP LT 130 MM HG: CPT | Performed by: NURSE PRACTITIONER

## 2024-07-16 PROCEDURE — 84481 FREE ASSAY (FT-3): CPT | Performed by: NURSE PRACTITIONER

## 2024-07-16 PROCEDURE — 80053 COMPREHEN METABOLIC PANEL: CPT | Performed by: NURSE PRACTITIONER

## 2024-07-16 PROCEDURE — G0402 INITIAL PREVENTIVE EXAM: HCPCS | Performed by: NURSE PRACTITIONER

## 2024-07-16 PROCEDURE — 84439 ASSAY OF FREE THYROXINE: CPT | Performed by: NURSE PRACTITIONER

## 2024-07-16 PROCEDURE — 80061 LIPID PANEL: CPT | Performed by: NURSE PRACTITIONER

## 2024-07-16 PROCEDURE — 85025 COMPLETE CBC W/AUTO DIFF WBC: CPT | Performed by: NURSE PRACTITIONER

## 2024-07-16 PROCEDURE — 3078F DIAST BP <80 MM HG: CPT | Performed by: NURSE PRACTITIONER

## 2024-07-16 PROCEDURE — 99214 OFFICE O/P EST MOD 30 MIN: CPT | Performed by: NURSE PRACTITIONER

## 2024-07-16 PROCEDURE — 84443 ASSAY THYROID STIM HORMONE: CPT | Performed by: NURSE PRACTITIONER

## 2024-07-16 PROCEDURE — 1170F FXNL STATUS ASSESSED: CPT | Performed by: NURSE PRACTITIONER

## 2024-07-16 RX ORDER — THIAMINE HCL 100 MG
1 TABLET ORAL DAILY
COMMUNITY

## 2024-07-16 NOTE — PROGRESS NOTES
Subjective   The ABCs of the Annual Wellness Visit  Medicare Wellness Visit      Mony Echeverria is a 68 y.o. patient who presents for a Medicare Wellness Visit.    The following portions of the patient's history were reviewed and   updated as appropriate: allergies, current medications, past family history, past medical history, past social history, past surgical history, and problem list.    Compared to one year ago, the patient's physical   health is better.  Compared to one year ago, the patient's mental   health is better.    Recent Hospitalizations:  She was not admitted to the hospital during the last year.     Current Medical Providers:  Patient Care Team:  Jason Holden APRN as PCP - General (Internal Medicine)  Estefani Banda APRN as Nurse Practitioner (Gastroenterology)    Outpatient Medications Prior to Visit   Medication Sig Dispense Refill    albuterol sulfate  (90 Base) MCG/ACT inhaler Ventolin HFA 90 mcg/actuation inhalation HFA aerosol inhaler inhale 1 - 2 puffs by inhalation route every 4-6 hours as needed   Suspended      Arnuity Ellipta 200 MCG/ACT       aspirin 81 MG chewable tablet Chew 1 tablet Daily.      Calcium Citrate-Vitamin D3 (CITRACAL) 315-6.25 MG-MCG tablet tablet Take 1 tablet by mouth Daily.      Glycerin-Polysorbate 80 (REFRESH DRY EYE THERAPY OP) Apply  to eye(s) as directed by provider.      levothyroxine (SYNTHROID, LEVOTHROID) 125 MCG tablet Take 1 tablet by mouth Daily.      lisinopril (PRINIVIL,ZESTRIL) 10 MG tablet Take 1 tablet by mouth Daily. 90 tablet 1    montelukast (SINGULAIR) 10 MG tablet Take 1 tablet by mouth Every Night.      multivitamin (THERAGRAN) tablet tablet Take 1 tablet by mouth Daily.      pantoprazole (PROTONIX) 40 MG EC tablet Take 1 tablet by mouth Daily. 30 tablet 5    pravastatin (PRAVACHOL) 40 MG tablet Take 1 tablet by mouth Daily. 90 tablet 1    EPINEPHrine (EPIPEN) 0.3 MG/0.3ML solution auto-injector injection AS DIRECTED  "INJECTION AS NEEDED (Patient not taking: Reported on 4/3/2024)       No facility-administered medications prior to visit.     No opioid medication identified on active medication list. I have reviewed chart for other potential  high risk medication/s and harmful drug interactions in the elderly.      Aspirin is on active medication list. Aspirin use is indicated based on review of current medical condition/s. Pros and cons of this therapy have been discussed today. Benefits of this medication outweigh potential harm.  Patient has been encouraged to continue taking this medication.  .      Patient Active Problem List   Diagnosis    Dysphagia    Vomiting without nausea    History of esophageal stricture    Asthma    Hematuria, microscopic    Hypertension    Hypothyroidism    Seasonal allergic rhinitis    Other hyperlipidemia    Overweight with body mass index (BMI) of 25 to 25.9 in adult    Encounter for annual wellness visit (AWV) in Medicare patient    Gastroesophageal reflux disease without esophagitis    Osteopenia of multiple sites     Advance Care Planning (Click this link to access ACP Navigator)  Advance Directive is not on file.  ACP discussion was held with the patient during this visit. Patient does not have an advance directive, information provided.        Objective   Vitals:    07/16/24 1255   BP: 116/76   BP Location: Left arm   Patient Position: Sitting   Cuff Size: Adult   Pulse: 58   Resp: 14   Temp: 96.6 °F (35.9 °C)   TempSrc: Temporal   SpO2: 97%   Weight: 81.3 kg (179 lb 3.2 oz)   Height: 170.2 cm (67.01\")       Estimated body mass index is 28.06 kg/m² as calculated from the following:    Height as of this encounter: 170.2 cm (67.01\").    Weight as of this encounter: 81.3 kg (179 lb 3.2 oz).             Does the patient have evidence of cognitive impairment? No                                                                                                Health  Risk Assessment    Smoking " Status:  Social History     Tobacco Use   Smoking Status Former    Current packs/day: 0.00    Average packs/day: 0.3 packs/day for 2.0 years (0.5 ttl pk-yrs)    Types: Cigarettes    Start date: 1973    Quit date: 3/1/1974    Years since quittin.4    Passive exposure: Past   Smokeless Tobacco Never     Alcohol Consumption:  Social History     Substance and Sexual Activity   Alcohol Use Yes    Alcohol/week: 4.0 standard drinks of alcohol    Types: 1 Glasses of wine, 3 Cans of beer per week    Comment: occasionally      Fall Risk Screen:  KARINA Fall Risk Assessment was completed, and patient is at LOW risk for falls.Assessment completed on:2024    Depression Screenin/16/2024    12:54 PM   PHQ-2/PHQ-9 Depression Screening   Little Interest or Pleasure in Doing Things 0-->not at all   Feeling Down, Depressed or Hopeless 0-->not at all   PHQ-9: Brief Depression Severity Measure Score 0     Health Habits and Functional and Cognitive Screenin/16/2024    12:52 PM   Functional & Cognitive Status   Do you have difficulty preparing food and eating? No   Do you have difficulty bathing yourself, getting dressed or grooming yourself? No   Do you have difficulty using the toilet? No   Do you have difficulty moving around from place to place? No   Do you have trouble with steps or getting out of a bed or a chair? No   Current Diet Well Balanced Diet   Dental Exam Up to date   Eye Exam Up to date   Exercise (times per week) 3 times per week   Current Exercises Include Other   Do you need help using the phone?  No   Are you deaf or do you have serious difficulty hearing?  No   Do you need help to go to places out of walking distance? No   Do you need help shopping? No   Do you need help preparing meals?  No   Do you need help with housework?  No   Do you need help with laundry? No   Do you need help taking your medications? No   Do you need help managing money? No   Do you ever drive or ride in a car  without wearing a seat belt? No   Have you felt unusual stress, anger or loneliness in the last month? No   Who do you live with? Spouse   If you need help, do you have trouble finding someone available to you? No   Have you been bothered in the last four weeks by sexual problems? No   Do you have difficulty concentrating, remembering or making decisions? No             Age-appropriate Screening Schedule:  Refer to the list below for future screening recommendations based on patient's age, sex and/or medical conditions. Orders for these recommended tests are listed in the plan section. The patient has been provided with a written plan.    Health Maintenance List  Health Maintenance   Topic Date Due    Pneumococcal Vaccine 65+ (2 of 2 - PPSV23 or PCV20) 12/11/2021    COVID-19 Vaccine (7 - 2023-24 season) 02/02/2024    INFLUENZA VACCINE  08/01/2024    LIPID PANEL  01/02/2025    COLORECTAL CANCER SCREENING  04/08/2025    BMI FOLLOWUP  04/16/2025    ANNUAL WELLNESS VISIT  07/16/2025    MAMMOGRAM  05/03/2026    DXA SCAN  05/03/2026    TDAP/TD VACCINES (3 - Td or Tdap) 05/10/2033    ZOSTER VACCINE  Completed    HEPATITIS C SCREENING  Discontinued                                                                                                                                                CMS Preventative Services Quick Reference  Risk Factors Identified During Encounter  None Identified    The above risks/problems have been discussed with the patient.  Pertinent information has been shared with the patient in the After Visit Summary.  An After Visit Summary and PPPS were made available to the patient.    Follow Up:   Next Medicare Wellness visit to be scheduled in 1 year.         Additional E&M Note during same encounter follows:  Patient has additional, significant, and separately identifiable condition(s)/problem(s) that require work above and beyond the Medicare Wellness Visit     Chief Complaint  Annual Exam  "(AMW)    Lupillo Sykes is also being seen today for additional medical problem/s.    Hypertension, well-controlled, on lisinopril.  Due for labs.    Hypothyroidism, due for recheck, on levothyroxine.    Cholesterol, due for recheck, on statin.      Objective   Vital Signs:  /76 (BP Location: Left arm, Patient Position: Sitting, Cuff Size: Adult)   Pulse 58   Temp 96.6 °F (35.9 °C) (Temporal)   Resp 14   Ht 170.2 cm (67.01\")   Wt 81.3 kg (179 lb 3.2 oz)   SpO2 97%   BMI 28.06 kg/m²   Physical Exam  Vitals and nursing note reviewed.   Constitutional:       Appearance: Normal appearance.   HENT:      Head: Normocephalic and atraumatic.   Cardiovascular:      Rate and Rhythm: Normal rate.   Pulmonary:      Effort: Pulmonary effort is normal.   Neurological:      Mental Status: She is alert.   Psychiatric:         Mood and Affect: Mood normal.         Thought Content: Thought content normal.               Assessment and Plan               Encounter for annual wellness visit (AWV) in Medicare patient  Screening labs reviewed/ordered  Counseling provided regarding age appropriate screenings and immunizations, healthy diet and exercise.     Seasonal allergic rhinitis, unspecified trigger    Primary hypertension    Other hyperlipidemia     Hypothyroidism, unspecified type    Gastroesophageal reflux disease without esophagitis    Dysphagia, unspecified type    Moderate persistent asthma, unspecified whether complicated          Osteopenia of multiple sites    Overall, patient doing very well.  Will check lab work to monitor medication safety, efficacy, and disease progression.  Continue to follow-up with specialist as recommended.  Follow-up with me in 4 months.  Orders Placed This Encounter   Procedures    Comprehensive Metabolic Panel     Order Specific Question:   Release to patient     Answer:   Routine Release [[id number]]    TSH     Order Specific Question:   Release to patient     Answer:   " Routine Release [[id number]]    Lipid Panel     Order Specific Question:   Release to patient     Answer:   Routine Release [[id number]]    T4, Free     Order Specific Question:   Release to patient     Answer:   Routine Release [[id number]]    T3, Free     Order Specific Question:   Release to patient     Answer:   Routine Release [[id number]]    CBC Auto Differential     Order Specific Question:   Release to patient     Answer:   Routine Release [[id number]]    CBC & Differential     Order Specific Question:   Manual Differential     Answer:   No     Order Specific Question:   Release to patient     Answer:   Routine Release [[id number]]             Follow Up   Return in about 4 months (around 11/16/2024) for Recheck.  Patient was given instructions and counseling regarding her condition or for health maintenance advice. Please see specific information pulled into the AVS if appropriate.

## 2024-07-18 RX ORDER — EPINEPHRINE 0.3 MG/.3ML
0.3 INJECTION SUBCUTANEOUS ONCE
Qty: 1 EACH | Refills: 0 | Status: SHIPPED | OUTPATIENT
Start: 2024-07-18 | End: 2024-07-18

## 2024-08-13 ENCOUNTER — OFFICE VISIT (OUTPATIENT)
Dept: INTERNAL MEDICINE | Facility: CLINIC | Age: 69
End: 2024-08-13
Payer: MEDICARE

## 2024-08-13 VITALS
SYSTOLIC BLOOD PRESSURE: 126 MMHG | HEART RATE: 56 BPM | BODY MASS INDEX: 28.82 KG/M2 | HEIGHT: 67 IN | DIASTOLIC BLOOD PRESSURE: 78 MMHG | TEMPERATURE: 97.8 F | WEIGHT: 183.6 LBS | OXYGEN SATURATION: 98 %

## 2024-08-13 DIAGNOSIS — H00.011 HORDEOLUM EXTERNUM OF RIGHT UPPER EYELID: Primary | ICD-10-CM

## 2024-08-13 PROCEDURE — 1159F MED LIST DOCD IN RCRD: CPT | Performed by: PHYSICIAN ASSISTANT

## 2024-08-13 PROCEDURE — 3078F DIAST BP <80 MM HG: CPT | Performed by: PHYSICIAN ASSISTANT

## 2024-08-13 PROCEDURE — 99213 OFFICE O/P EST LOW 20 MIN: CPT | Performed by: PHYSICIAN ASSISTANT

## 2024-08-13 PROCEDURE — 1160F RVW MEDS BY RX/DR IN RCRD: CPT | Performed by: PHYSICIAN ASSISTANT

## 2024-08-13 PROCEDURE — 3074F SYST BP LT 130 MM HG: CPT | Performed by: PHYSICIAN ASSISTANT

## 2024-08-13 RX ORDER — POLYMYXIN B SULFATE AND TRIMETHOPRIM 1; 10000 MG/ML; [USP'U]/ML
1 SOLUTION OPHTHALMIC EVERY 4 HOURS
Qty: 10 ML | Refills: 0 | Status: SHIPPED | OUTPATIENT
Start: 2024-08-13 | End: 2024-08-20

## 2024-08-13 RX ORDER — EPINEPHRINE 0.3 MG/.3ML
INJECTION SUBCUTANEOUS
COMMUNITY
Start: 2024-07-18

## 2024-08-13 NOTE — PROGRESS NOTES
"Chief Complaint  Stye (On right eye, comes and goes. Oozes yellow )    Subjective          Mony Echeverria presents to [facility]    Stye- on right eye, has been coming and going for the past 3 months. She has had some yellowish drainage as well. No vision changes.  No swelling.       Objective   Vital Signs:   /78 (BP Location: Left arm, Patient Position: Sitting, Cuff Size: Adult)   Pulse 56   Temp 97.8 °F (36.6 °C) (Temporal)   Ht 170.2 cm (67.01\")   Wt 83.3 kg (183 lb 9.6 oz)   SpO2 98%   BMI 28.75 kg/m²     Physical Exam  Vitals reviewed.   Constitutional:       Appearance: Normal appearance. She is well-developed.   HENT:      Head: Normocephalic and atraumatic.   Eyes:      Conjunctiva/sclera: Conjunctivae normal.      Pupils: Pupils are equal, round, and reactive to light.      Comments: R upper eyelid with hordeolum near lacrimal caruncle    Cardiovascular:      Rate and Rhythm: Normal rate and regular rhythm.      Heart sounds: No murmur heard.     No friction rub. No gallop.   Pulmonary:      Effort: Pulmonary effort is normal.      Breath sounds: Normal breath sounds. No wheezing or rhonchi.   Skin:     General: Skin is warm and dry.   Neurological:      Mental Status: She is alert and oriented to person, place, and time.      Cranial Nerves: No cranial nerve deficit.   Psychiatric:         Mood and Affect: Mood and affect normal.         Behavior: Behavior normal.         Thought Content: Thought content normal.         Judgment: Judgment normal.        Result Review :          Procedures      Assessment and Plan    Diagnoses and all orders for this visit:    1. Hordeolum externum of right upper eyelid (Primary)  Assessment & Plan:  Continue conservative treatment with warm compresses.  Will send in Polytrim eye drops and have patient follow up with Ophthalmology.        Other orders  -     trimethoprim-polymyxin b (Polytrim) 77485-2.1 " UNIT/ML-% ophthalmic solution; Administer 1 drop to the right eye Every 4 (Four) Hours for 7 days.  Dispense: 10 mL; Refill: 0              Follow Up   No follow-ups on file.  Patient was given instructions and counseling regarding her condition or for health maintenance advice. Please see specific information pulled into the AVS if appropriate.

## 2024-08-13 NOTE — ASSESSMENT & PLAN NOTE
Continue conservative treatment with warm compresses.  Will send in Polytrim eye drops and have patient follow up with Ophthalmology.

## 2024-09-10 ENCOUNTER — OFFICE VISIT (OUTPATIENT)
Dept: GASTROENTEROLOGY | Facility: CLINIC | Age: 69
End: 2024-09-10
Payer: MEDICARE

## 2024-09-10 VITALS
WEIGHT: 182.6 LBS | OXYGEN SATURATION: 99 % | BODY MASS INDEX: 28.66 KG/M2 | DIASTOLIC BLOOD PRESSURE: 71 MMHG | HEIGHT: 67 IN | SYSTOLIC BLOOD PRESSURE: 157 MMHG | HEART RATE: 62 BPM

## 2024-09-10 DIAGNOSIS — R13.10 DYSPHAGIA, UNSPECIFIED TYPE: Primary | ICD-10-CM

## 2024-09-10 DIAGNOSIS — Z87.19 HISTORY OF ESOPHAGEAL STRICTURE: ICD-10-CM

## 2024-09-10 DIAGNOSIS — K21.9 GASTROESOPHAGEAL REFLUX DISEASE, UNSPECIFIED WHETHER ESOPHAGITIS PRESENT: ICD-10-CM

## 2024-09-10 RX ORDER — PANTOPRAZOLE SODIUM 40 MG/1
40 TABLET, DELAYED RELEASE ORAL DAILY
Qty: 90 TABLET | Refills: 2 | Status: SHIPPED | OUTPATIENT
Start: 2024-09-10

## 2024-09-10 NOTE — PROGRESS NOTES
Chief Complaint   Follow-up (No specific concerns at this time, pantoprazole is working well for patient. )    History of Present Illness       Mony Echeverria is a 68 y.o. female who presents to [facility] for follow-up with a history of dysphagia, hiatal hernia, Schatzki's ring with dilation.  We reviewed recent endoscopy and pathology at this time.  Patient reports esophageal dilation improved her dysphagia.  Patient continues on Protonix 40 mg daily good relief of reflux.  Patient voices no concerns at this time and will be due for colonoscopy next year.  Patient denies fever, nausea, vomiting, weight loss, night sweats, melena, hematochezia, hematemesis.    Most recent labs- 7/16/24    Endoscopy: Review of the patient's most recent EGD performed by Dr. White on 4/3/24 medium size hiatal hernia, moderate Schatzki's ring dilated to 18 mm, diffuse mild inflammation characterized by erosions and erythema single 4 mm sessile polyp.     CT abdomen/pelvis with contrast 3/26/2024 -colonic diverticula, formed stool throughout colon, maximum diameter of the cecum is 6.2 cm there may be a submucosal lipoma at or near the level of the ileocecal valve unchanged compared to prior study (6/19/2021) estimated about 3 cm in diameter, no mechanical bowel obstruction, moderate size hiatal hernia, benign cyst or hemangioma in the liver     EGD/colonoscopy 12/30/2020 by Dr. Bolden-benign stricture at GE junction, dilation performed to 18 mm, grade 1 esophagitis, medium hiatal hernia, normal stomach and duodenum.  Esophageal biopsies-slight reactive changes.  Stomach biopsies normal.  Normal mucosa throughout colon with grade 1 internal hemorrhoids.  Repeat colonoscopy in 5 years due to quality of prep.    Results       Result Review :   The following data was reviewed by: RIAN Mann on 09/10/2024:    CMP          3/26/2024    20:02 4/1/2024    20:20 7/16/2024    13:28   [facility]   Glucose  "112  119  90    BUN 19  15  17    Creatinine 0.75  0.71  0.74    EGFR 86.8  92.7  88.3    Sodium 143  144  142    Potassium 4.2  3.4  4.1    Chloride 104  104  103    Calcium 9.6  9.5  9.9    Total Protein 7.4  7.4  7.5    Albumin 4.4  4.4  4.5    Globulin 3.0  3.0  3.0    Total Bilirubin 0.2  0.2  0.2    Alkaline Phosphatase 96  88  83    AST (SGOT) 22  22  28    ALT (SGPT) 19  20  22    Albumin/Globulin Ratio 1.5  1.5  1.5    BUN/Creatinine Ratio 25.3  21.1  23.0    Anion Gap 10.3  10.5  11.2      CBC          3/26/2024    20:02 2024    20:20 2024    13:28   CBC   WBC 6.93  7.01  7.09    RBC 4.86  4.59  4.45    Hemoglobin 14.0  13.5  13.1    Hematocrit 43.5  40.5  39.6    MCV 89.5  88.2  89.0    MCH 28.8  29.4  29.4    MCHC 32.2  33.3  33.1    RDW 12.6  13.1  12.7    Platelets 312  304  324        Iron Profile No results found for: \"IRON\", \"TIBC\", \"LABIRON\", \"TRANSFERRIN\"  Ferritin No results found for: \"FERRITIN\"            Past Medical History       Past Medical History:   Diagnosis Date    Allergic     Asthma     Cancer     Cataract     Cholelithiasis     Colon polyp     Disease of thyroid gland     GERD (gastroesophageal reflux disease)     Heart murmur     Hyperlipidemia     Hypertension     Hypothyroidism     Kidney stone        Past Surgical History:   Procedure Laterality Date    APPENDECTOMY      CATARACT EXTRACTION       SECTION      CHOLECYSTECTOMY      COLONOSCOPY      ENDOSCOPY N/A 4/3/2024    Procedure: ESOPHAGOGASTRODUODENOSCOPY WITH BIOPSIES, POLYPECTOMY, BALLOON DILATATION 15-18;  Surgeon: Crispin White MD;  Location: [facility];  Service: Gastroenterology;  Laterality: N/A;  HIATAL HERNIA, GASTRIC BODY POLYP, EROSIVE GASTRITIS, SCHATZKIS RING    EXPLORATORY LAPAROTOMY      HYSTERECTOMY      UPPER GASTROINTESTINAL ENDOSCOPY           Current Outpatient Medications:     albuterol sulfate  (90 Base) MCG/ACT inhaler, Ventolin HFA 90 mcg/actuation inhalation HFA " "aerosol inhaler inhale 1 - 2 puffs by inhalation route every 4-6 hours as needed   Suspended, Disp: , Rfl:     Arnuity Ellipta 200 MCG/ACT, , Disp: , Rfl:     aspirin 81 MG chewable tablet, Chew 1 tablet Daily., Disp: , Rfl:     EPINEPHrine (EPIPEN) 0.3 MG/0.3ML solution auto-injector injection, , Disp: , Rfl:     Glycerin-Polysorbate 80 (REFRESH DRY EYE THERAPY OP), Apply  to eye(s) as directed by provider., Disp: , Rfl:     levothyroxine (SYNTHROID, LEVOTHROID) 125 MCG tablet, Take 1 tablet by mouth Daily., Disp: , Rfl:     lisinopril (PRINIVIL,ZESTRIL) 10 MG tablet, Take 1 tablet by mouth Daily., Disp: 90 tablet, Rfl: 1    montelukast (SINGULAIR) 10 MG tablet, Take 1 tablet by mouth Every Night., Disp: , Rfl:     multivitamin (THERAGRAN) tablet tablet, Take 1 tablet by mouth Daily., Disp: , Rfl:     pantoprazole (PROTONIX) 40 MG EC tablet, Take 1 tablet by mouth Daily., Disp: 90 tablet, Rfl: 2    pravastatin (PRAVACHOL) 40 MG tablet, Take 1 tablet by mouth Daily., Disp: 90 tablet, Rfl: 1    Calcium Citrate-Vitamin D3 (CITRACAL) 315-6.25 MG-MCG tablet tablet, Take 1 tablet by mouth Daily., Disp: , Rfl:      Allergies   Allergen Reactions    Seasonal Ic [Octacosanol] Unknown - Low Severity    Cephalexin Rash    Latex Rash    Sulfa Antibiotics Rash       Family History   Problem Relation Age of Onset    Diabetes Mother         Became diabetic in her fifties    Mental illness Father         Manic - Bipolar    Diabetes Sister         Became in fifties    Diabetes Sister         Became in fifties    Diabetes Brother         Became in fifties    Thyroid disease Sister         In her thirties, also siblings listed above.    Colon cancer Neg Hx         Social History     Social History Narrative    Not on file       Objective     Vital Signs:   /71 (BP Location: Right arm, Patient Position: Sitting, Cuff Size: Adult)   Pulse 62   Ht 170.2 cm (67.01\")   Wt 82.8 kg (182 lb 9.6 oz)   SpO2 99%   BMI 28.59 kg/m²   "     Physical Exam  Constitutional:       Appearance: Normal appearance.   Pulmonary:      Effort: Pulmonary effort is normal.   Neurological:      General: No focal deficit present.      Mental Status: She is alert and oriented to person, place, and time.   Psychiatric:         Mood and Affect: Mood normal.         Behavior: Behavior normal.           Assessment & Plan          Assessment and Plan    Diagnoses and all orders for this visit:    1. Dysphagia, unspecified type (Primary)    2. History of esophageal stricture    3. Gastroesophageal reflux disease, unspecified whether esophagitis present    Other orders  -     pantoprazole (PROTONIX) 40 MG EC tablet; Take 1 tablet by mouth Daily.  Dispense: 90 tablet; Refill: 2      68-year-old female presenting to the office today for follow-up with a history of dysphagia, hiatal hernia, Schatzki's ring with dilation.  We reviewed recent endoscopy and pathology at this time.  Patient reports esophageal dilation improved her dysphagia.  Patient continues on Protonix 40 mg daily good relief of reflux, I have therefore sent in Protonix as this is working well for reflux.  Patient voices no concerns at this time and will be due for colonoscopy next year.  1 year follow-up.  Patient agreeable to this plan and will call with any questions or concerns.          Follow Up       Follow Up   Return in about 1 year (around 9/10/2025).  Patient was given instructions and counseling regarding her condition or for health maintenance advice. Please see specific information pulled into the AVS if appropriate.

## 2024-10-01 RX ORDER — PANTOPRAZOLE SODIUM 40 MG/1
40 TABLET, DELAYED RELEASE ORAL DAILY
Qty: 90 TABLET | Refills: 1 | OUTPATIENT
Start: 2024-10-01

## 2024-11-12 ENCOUNTER — OFFICE VISIT (OUTPATIENT)
Dept: INTERNAL MEDICINE | Facility: CLINIC | Age: 69
End: 2024-11-12
Payer: MEDICARE

## 2024-11-12 VITALS
SYSTOLIC BLOOD PRESSURE: 110 MMHG | DIASTOLIC BLOOD PRESSURE: 80 MMHG | WEIGHT: 181.2 LBS | BODY MASS INDEX: 28.44 KG/M2 | TEMPERATURE: 96.2 F | HEART RATE: 55 BPM | HEIGHT: 67 IN | OXYGEN SATURATION: 98 %

## 2024-11-12 DIAGNOSIS — E78.49 OTHER HYPERLIPIDEMIA: ICD-10-CM

## 2024-11-12 DIAGNOSIS — E66.3 OVERWEIGHT WITH BODY MASS INDEX (BMI) OF 25 TO 25.9 IN ADULT: ICD-10-CM

## 2024-11-12 DIAGNOSIS — K21.9 GASTROESOPHAGEAL REFLUX DISEASE WITHOUT ESOPHAGITIS: ICD-10-CM

## 2024-11-12 DIAGNOSIS — R13.10 DYSPHAGIA, UNSPECIFIED TYPE: ICD-10-CM

## 2024-11-12 DIAGNOSIS — E03.9 HYPOTHYROIDISM, UNSPECIFIED TYPE: ICD-10-CM

## 2024-11-12 DIAGNOSIS — I10 PRIMARY HYPERTENSION: Primary | ICD-10-CM

## 2024-11-12 PROCEDURE — 3079F DIAST BP 80-89 MM HG: CPT | Performed by: NURSE PRACTITIONER

## 2024-11-12 PROCEDURE — 99214 OFFICE O/P EST MOD 30 MIN: CPT | Performed by: NURSE PRACTITIONER

## 2024-11-12 PROCEDURE — 3074F SYST BP LT 130 MM HG: CPT | Performed by: NURSE PRACTITIONER

## 2024-11-12 PROCEDURE — G2211 COMPLEX E/M VISIT ADD ON: HCPCS | Performed by: NURSE PRACTITIONER

## 2024-11-12 NOTE — ASSESSMENT & PLAN NOTE
Stable at current dosing, recent labs reviewed.  Will follow-up in 7 months with labs unless needed sooner

## 2024-11-12 NOTE — ASSESSMENT & PLAN NOTE
Labs reviewed from July, slightly elevated LDL, we will monitor for now, on statin already.  Otherwise no significant risk factors other than blood pressure which is very well-controlled.  Will plan for follow-up in 7 months unless needed sooner.

## 2024-11-12 NOTE — ASSESSMENT & PLAN NOTE
Currently well-controlled on current regimen, labs reviewed.  Will follow-up in 7 months and get on a 6-month rotation at that time.  Can be seen sooner if needed.

## 2024-11-12 NOTE — ASSESSMENT & PLAN NOTE
Patient's (Body mass index is 28.37 kg/m².) indicates that they are overweight with health conditions that include hypertension . Weight is newly identified. BMI is above average; BMI management plan is completed. We discussed portion control and increasing exercise.

## 2024-11-12 NOTE — PROGRESS NOTES
"Chief Complaint  Follow-up (Would like labs for her liver )    Subjective        Mony Echeverria presents to [facility] for follow-up for chronic conditions.  Patient last seen in July for annual wellness visit.  She reports no significant changes since that visit.  Labs were completed at that time, and overall unremarkable.  She has been seen by one of our providers for an eye complaint, she has followed up with her eye doctor, and currently symptoms are doing better.  She also followed up with gastroenterology, they do plan to scope her next year when she comes due.  She otherwise has no significant concerns or complaints.  No changes in medications.  She is not needing any refills at this time.    Objective   Vital Signs:   /80   Pulse 55   Temp 96.2 °F (35.7 °C) (Temporal)   Ht 170.2 cm (67.01\")   Wt 82.2 kg (181 lb 3.2 oz)   SpO2 98%   BMI 28.37 kg/m²     Physical Exam  Vitals and nursing note reviewed.   Constitutional:       Appearance: Normal appearance.   HENT:      Head: Normocephalic and atraumatic.      Right Ear: External ear normal.      Left Ear: External ear normal.   Cardiovascular:      Rate and Rhythm: Normal rate.   Pulmonary:      Effort: Pulmonary effort is normal.   Neurological:      Mental Status: She is alert.   Psychiatric:         Mood and Affect: Mood normal.         Thought Content: Thought content normal.        Result Review :  {The following data was reviewed by RIAN Lindquist on 11/12/24                Diagnoses and all orders for this visit:    1. Primary hypertension (Primary)  Assessment & Plan:  Currently well-controlled on current regimen, labs reviewed.  Will follow-up in 7 months and get on a 6-month rotation at that time.  Can be seen sooner if needed.      2. Other hyperlipidemia  Assessment & Plan:  Labs reviewed from July, slightly elevated LDL, we will monitor for now, on statin already.  Otherwise no significant risk factors " other than blood pressure which is very well-controlled.  Will plan for follow-up in 7 months unless needed sooner.      3. Hypothyroidism, unspecified type  Assessment & Plan:  Stable at current dosing, recent labs reviewed.  Will follow-up in 7 months with labs unless needed sooner      4. Gastroesophageal reflux disease without esophagitis  Assessment & Plan:  Currently well-controlled, managed by GI, they do plan for repeat scope in late 2025.      5. Dysphagia, unspecified type  Assessment & Plan:  Currently well-controlled, followed by GI, expecting repeat scope in 2025.      6. Overweight with body mass index (BMI) of 25 to 25.9 in adult  Assessment & Plan:  Patient's (Body mass index is 28.37 kg/m².) indicates that they are overweight with health conditions that include hypertension . Weight is newly identified. BMI is above average; BMI management plan is completed. We discussed portion control and increasing exercise.             Follow Up   Return in about 7 months (around 6/12/2025) for Recheck.  Patient was given instructions and counseling regarding her condition or for health maintenance advice. Please see specific information pulled into the AVS if appropriate.     RIAN Lindquist  11/12/2024  This note was electronically signed.

## 2024-11-15 DIAGNOSIS — E78.49 OTHER HYPERLIPIDEMIA: ICD-10-CM

## 2024-11-15 DIAGNOSIS — I10 PRIMARY HYPERTENSION: ICD-10-CM

## 2024-11-15 RX ORDER — LISINOPRIL 10 MG/1
10 TABLET ORAL DAILY
Qty: 90 TABLET | Refills: 1 | Status: SHIPPED | OUTPATIENT
Start: 2024-11-15

## 2024-11-15 RX ORDER — PRAVASTATIN SODIUM 40 MG
40 TABLET ORAL DAILY
Qty: 90 TABLET | Refills: 1 | Status: CANCELLED | OUTPATIENT
Start: 2024-11-15

## 2024-11-15 RX ORDER — LEVOTHYROXINE SODIUM 125 UG/1
125 TABLET ORAL DAILY
Qty: 30 TABLET | Refills: 0 | Status: SHIPPED | OUTPATIENT
Start: 2024-11-15

## 2024-11-15 RX ORDER — PRAVASTATIN SODIUM 40 MG
40 TABLET ORAL DAILY
Qty: 90 TABLET | Refills: 1 | Status: SHIPPED | OUTPATIENT
Start: 2024-11-15

## 2024-11-15 RX ORDER — LISINOPRIL 10 MG/1
10 TABLET ORAL DAILY
Qty: 90 TABLET | Refills: 1 | Status: CANCELLED | OUTPATIENT
Start: 2024-11-15

## 2024-11-15 NOTE — TELEPHONE ENCOUNTER
Patient stated she is out of levothyroxine and is requesting a refill sent to [facility], I have pended order.

## 2024-11-15 NOTE — TELEPHONE ENCOUNTER
Caller: Mony Echeverria    Relationship to patient: Self    Best call back number: [phone]    Patient is needing: PATIENT STATED SHE NEEDS THE LEVOTHYROXINE MEDICATION TODAY IF POSSIBLE.

## 2024-11-19 DIAGNOSIS — E78.49 OTHER HYPERLIPIDEMIA: ICD-10-CM

## 2024-11-19 RX ORDER — PRAVASTATIN SODIUM 40 MG
40 TABLET ORAL DAILY
Qty: 90 TABLET | Refills: 3 | Status: SHIPPED | OUTPATIENT
Start: 2024-11-19

## 2024-11-26 DIAGNOSIS — I10 PRIMARY HYPERTENSION: ICD-10-CM

## 2024-11-26 DIAGNOSIS — E78.49 OTHER HYPERLIPIDEMIA: ICD-10-CM

## 2024-11-26 DIAGNOSIS — K21.9 GASTROESOPHAGEAL REFLUX DISEASE, UNSPECIFIED WHETHER ESOPHAGITIS PRESENT: Primary | ICD-10-CM

## 2024-11-26 RX ORDER — LISINOPRIL 10 MG/1
10 TABLET ORAL DAILY
Qty: 90 TABLET | Refills: 1 | Status: SHIPPED | OUTPATIENT
Start: 2024-11-26

## 2024-11-26 RX ORDER — LEVOTHYROXINE SODIUM 125 UG/1
125 TABLET ORAL DAILY
Qty: 30 TABLET | Refills: 0 | Status: SHIPPED | OUTPATIENT
Start: 2024-11-26

## 2024-11-26 RX ORDER — PANTOPRAZOLE SODIUM 40 MG/1
40 TABLET, DELAYED RELEASE ORAL DAILY
Qty: 90 TABLET | Refills: 2 | Status: SHIPPED | OUTPATIENT
Start: 2024-11-26

## 2024-11-26 RX ORDER — PRAVASTATIN SODIUM 40 MG
40 TABLET ORAL DAILY
Qty: 90 TABLET | Refills: 3 | OUTPATIENT
Start: 2024-11-26

## 2024-11-26 NOTE — TELEPHONE ENCOUNTER
Medication Requested pantoprazole    Last Refill 9/10/2024    Last OV 9/10/2024    Next OV 9/11/2025    Medication pended for approval and correct pharmacy verified yes

## 2024-12-13 RX ORDER — LEVOTHYROXINE SODIUM 125 UG/1
125 TABLET ORAL DAILY
Qty: 90 TABLET | Refills: 1 | Status: SHIPPED | OUTPATIENT
Start: 2024-12-13

## 2025-03-20 ENCOUNTER — TRANSCRIBE ORDERS (OUTPATIENT)
Dept: ADMINISTRATIVE | Facility: HOSPITAL | Age: 70
End: 2025-03-20
Payer: MEDICARE

## 2025-03-20 DIAGNOSIS — Z12.31 SCREENING MAMMOGRAM FOR BREAST CANCER: Primary | ICD-10-CM

## 2025-04-02 ENCOUNTER — HOSPITAL ENCOUNTER (EMERGENCY)
Facility: HOSPITAL | Age: 70
Discharge: HOME | End: 2025-04-02
Attending: EMERGENCY MEDICINE | Admitting: EMERGENCY MEDICINE
Payer: MEDICARE

## 2025-04-02 ENCOUNTER — APPOINTMENT (OUTPATIENT)
Dept: GENERAL RADIOLOGY | Facility: HOSPITAL | Age: 70
End: 2025-04-02
Payer: MEDICARE

## 2025-04-02 ENCOUNTER — TELEPHONE (OUTPATIENT)
Dept: INTERNAL MEDICINE | Facility: CLINIC | Age: 70
End: 2025-04-02

## 2025-04-02 VITALS
WEIGHT: 185 LBS | BODY MASS INDEX: 29.03 KG/M2 | HEIGHT: 67 IN | OXYGEN SATURATION: 97 % | HEART RATE: 62 BPM | SYSTOLIC BLOOD PRESSURE: 173 MMHG | RESPIRATION RATE: 20 BRPM | DIASTOLIC BLOOD PRESSURE: 80 MMHG | TEMPERATURE: 97.9 F

## 2025-04-02 DIAGNOSIS — R07.9 CHEST PAIN, UNSPECIFIED TYPE: Primary | ICD-10-CM

## 2025-04-02 LAB
ALBUMIN SERPL-MCNC: 3.9 G/DL (ref 3.5–5.2)
ALBUMIN/GLOB SERPL: 1.3 G/DL
ALP SERPL-CCNC: 98 U/L (ref 39–117)
ALT SERPL W P-5'-P-CCNC: 19 U/L (ref 1–33)
ANION GAP SERPL CALCULATED.3IONS-SCNC: 10.4 MMOL/L (ref 5–15)
AST SERPL-CCNC: 20 U/L (ref 1–32)
BASOPHILS # BLD AUTO: 0.04 10*3/MM3 (ref 0–0.2)
BASOPHILS NFR BLD AUTO: 0.6 % (ref 0–1.5)
BILIRUB SERPL-MCNC: 0.2 MG/DL (ref 0–1.2)
BUN SERPL-MCNC: 14 MG/DL (ref 8–23)
BUN/CREAT SERPL: 20.3 (ref 7–25)
CALCIUM SPEC-SCNC: 9.4 MG/DL (ref 8.6–10.5)
CHLORIDE SERPL-SCNC: 104 MMOL/L (ref 98–107)
CO2 SERPL-SCNC: 25.6 MMOL/L (ref 22–29)
CREAT SERPL-MCNC: 0.69 MG/DL (ref 0.57–1)
DEPRECATED RDW RBC AUTO: 44.6 FL (ref 37–54)
EGFRCR SERPLBLD CKD-EPI 2021: 94.1 ML/MIN/1.73
EOSINOPHIL # BLD AUTO: 0.11 10*3/MM3 (ref 0–0.4)
EOSINOPHIL NFR BLD AUTO: 1.6 % (ref 0.3–6.2)
ERYTHROCYTE [DISTWIDTH] IN BLOOD BY AUTOMATED COUNT: 13.2 % (ref 12.3–15.4)
GEN 5 1HR TROPONIN T REFLEX: <6 NG/L
GLOBULIN UR ELPH-MCNC: 3 GM/DL
GLUCOSE SERPL-MCNC: 116 MG/DL (ref 65–99)
HCT VFR BLD AUTO: 40.8 % (ref 34–46.6)
HGB BLD-MCNC: 13.2 G/DL (ref 12–15.9)
HOLD SPECIMEN: NORMAL
HOLD SPECIMEN: NORMAL
IMM GRANULOCYTES # BLD AUTO: 0.02 10*3/MM3 (ref 0–0.05)
IMM GRANULOCYTES NFR BLD AUTO: 0.3 % (ref 0–0.5)
LIPASE SERPL-CCNC: 55 U/L (ref 13–60)
LYMPHOCYTES # BLD AUTO: 2.19 10*3/MM3 (ref 0.7–3.1)
LYMPHOCYTES NFR BLD AUTO: 31.5 % (ref 19.6–45.3)
MAGNESIUM SERPL-MCNC: 2 MG/DL (ref 1.6–2.4)
MCH RBC QN AUTO: 29.8 PG (ref 26.6–33)
MCHC RBC AUTO-ENTMCNC: 32.4 G/DL (ref 31.5–35.7)
MCV RBC AUTO: 92.1 FL (ref 79–97)
MONOCYTES # BLD AUTO: 0.48 10*3/MM3 (ref 0.1–0.9)
MONOCYTES NFR BLD AUTO: 6.9 % (ref 5–12)
NEUTROPHILS NFR BLD AUTO: 4.12 10*3/MM3 (ref 1.7–7)
NEUTROPHILS NFR BLD AUTO: 59.1 % (ref 42.7–76)
NRBC BLD AUTO-RTO: 0 /100 WBC (ref 0–0.2)
NT-PROBNP SERPL-MCNC: <36 PG/ML (ref 0–900)
PLATELET # BLD AUTO: 317 10*3/MM3 (ref 140–450)
PMV BLD AUTO: 9 FL (ref 6–12)
POTASSIUM SERPL-SCNC: 3.8 MMOL/L (ref 3.5–5.2)
PROT SERPL-MCNC: 6.9 G/DL (ref 6–8.5)
QT INTERVAL: 389 MS
QTC INTERVAL: 412 MS
RBC # BLD AUTO: 4.43 10*6/MM3 (ref 3.77–5.28)
SODIUM SERPL-SCNC: 140 MMOL/L (ref 136–145)
TROPONIN T NUMERIC DELTA: NORMAL
TROPONIN T SERPL HS-MCNC: 8 NG/L
WBC NRBC COR # BLD AUTO: 6.96 10*3/MM3 (ref 3.4–10.8)
WHOLE BLOOD HOLD COAG: NORMAL
WHOLE BLOOD HOLD SPECIMEN: NORMAL

## 2025-04-02 PROCEDURE — 84484 ASSAY OF TROPONIN QUANT: CPT | Performed by: EMERGENCY MEDICINE

## 2025-04-02 PROCEDURE — 71045 X-RAY EXAM CHEST 1 VIEW: CPT

## 2025-04-02 PROCEDURE — 93005 ELECTROCARDIOGRAM TRACING: CPT | Performed by: EMERGENCY MEDICINE

## 2025-04-02 PROCEDURE — 83690 ASSAY OF LIPASE: CPT

## 2025-04-02 PROCEDURE — 85025 COMPLETE CBC W/AUTO DIFF WBC: CPT

## 2025-04-02 PROCEDURE — 96374 THER/PROPH/DIAG INJ IV PUSH: CPT

## 2025-04-02 PROCEDURE — 83880 ASSAY OF NATRIURETIC PEPTIDE: CPT

## 2025-04-02 PROCEDURE — 83735 ASSAY OF MAGNESIUM: CPT

## 2025-04-02 PROCEDURE — 36415 COLL VENOUS BLD VENIPUNCTURE: CPT

## 2025-04-02 PROCEDURE — 25010000002 FAMOTIDINE 10 MG/ML SOLUTION: Performed by: EMERGENCY MEDICINE

## 2025-04-02 PROCEDURE — 80053 COMPREHEN METABOLIC PANEL: CPT

## 2025-04-02 PROCEDURE — 93010 ELECTROCARDIOGRAM REPORT: CPT | Performed by: SPECIALIST

## 2025-04-02 PROCEDURE — 93005 ELECTROCARDIOGRAM TRACING: CPT

## 2025-04-02 PROCEDURE — 84484 ASSAY OF TROPONIN QUANT: CPT

## 2025-04-02 PROCEDURE — 99284 EMERGENCY DEPT VISIT MOD MDM: CPT

## 2025-04-02 RX ORDER — ALUMINA, MAGNESIA, AND SIMETHICONE 2400; 2400; 240 MG/30ML; MG/30ML; MG/30ML
15 SUSPENSION ORAL ONCE
Status: COMPLETED | OUTPATIENT
Start: 2025-04-02 | End: 2025-04-02

## 2025-04-02 RX ORDER — SODIUM CHLORIDE 0.9 % (FLUSH) 0.9 %
10 SYRINGE (ML) INJECTION AS NEEDED
Status: DISCONTINUED | OUTPATIENT
Start: 2025-04-02 | End: 2025-04-02 | Stop reason: HOSPADM

## 2025-04-02 RX ORDER — ASPIRIN 81 MG/1
324 TABLET, CHEWABLE ORAL ONCE
Status: COMPLETED | OUTPATIENT
Start: 2025-04-02 | End: 2025-04-02

## 2025-04-02 RX ORDER — FAMOTIDINE 20 MG/1
40 TABLET, FILM COATED ORAL
Qty: 30 TABLET | Refills: 0 | Status: SHIPPED | OUTPATIENT
Start: 2025-04-02

## 2025-04-02 RX ORDER — FAMOTIDINE 10 MG/ML
20 INJECTION, SOLUTION INTRAVENOUS ONCE
Status: COMPLETED | OUTPATIENT
Start: 2025-04-02 | End: 2025-04-02

## 2025-04-02 RX ADMIN — ASPIRIN 324 MG: 81 TABLET, CHEWABLE ORAL at 01:37

## 2025-04-02 RX ADMIN — FAMOTIDINE 20 MG: 10 INJECTION INTRAVENOUS at 02:38

## 2025-04-02 RX ADMIN — ALUMINUM HYDROXIDE, MAGNESIUM HYDROXIDE, AND DIMETHICONE 15 ML: 400; 400; 40 SUSPENSION ORAL at 02:38

## 2025-04-02 NOTE — ED PROVIDER NOTES
Time: 1:10 AM EDT  Date of encounter:  2025  Independent Historian/Clinical History and Information was obtained by:   Patient    History is limited by: N/A    Chief Complaint: Chest pain      History of Present Illness:  Patient is a 69 y.o. year old female who presents to the emergency department for evaluation of chest pain    Patient states she went to bed last night completely asymptomatic and feeling well and awoke at approximately 11 PM with left-sided chest pain and pressure.  She denies any radiation of the pain no shortness of breath no nausea or vomiting.  No leg pain or pedal edema.    Patient Care Team  Primary Care Provider: Jason Holden APRN    Past Medical History:     Allergies   Allergen Reactions    Seasonal Ic [Octacosanol] Unknown - Low Severity    Cephalexin Rash    Latex Rash    Sulfa Antibiotics Rash     Past Medical History:   Diagnosis Date    Allergic     Asthma     Cancer     Cataract     Cholelithiasis     Colon polyp     Disease of thyroid gland     GERD (gastroesophageal reflux disease)     Heart murmur     Hyperlipidemia     Hypertension     Hypothyroidism     Kidney stone      Past Surgical History:   Procedure Laterality Date    APPENDECTOMY      CATARACT EXTRACTION       SECTION      CHOLECYSTECTOMY      COLONOSCOPY      ENDOSCOPY N/A 4/3/2024    Procedure: ESOPHAGOGASTRODUODENOSCOPY WITH BIOPSIES, POLYPECTOMY, BALLOON DILATATION 15-18;  Surgeon: Crispin White MD;  Location: [facility] END[facility];  Service: Gastroenterology;  Laterality: N/A;  HIATAL HERNIA, GASTRIC BODY POLYP, EROSIVE GASTRITIS, SCHATZKIS RING    EXPLORATORY LAPAROTOMY      HYSTERECTOMY      UPPER GASTROINTESTINAL ENDOSCOPY       Family History   Problem Relation Age of Onset    Diabetes Mother         Became diabetic in her fifties    Mental illness Father         Manic - Bipolar    Diabetes Sister         Became in fifties    Diabetes Sister         Became in fifties    Diabetes Brother          Became in fifties    Thyroid disease Sister         In her thirties, also siblings listed above.    Colon cancer Neg Hx        Home Medications:  Prior to Admission medications    Medication Sig Start Date End Date Taking? Authorizing Provider   albuterol sulfate  (90 Base) MCG/ACT inhaler Ventolin HFA 90 mcg/actuation inhalation HFA aerosol inhaler inhale 1 - 2 puffs by inhalation route every 4-6 hours as needed   Suspended    Flora Mendoza MD   Arnuity Ellipta 200 MCG/ACT  24   Flora Mendoza MD   aspirin 81 MG chewable tablet Chew 1 tablet Daily.    Flora Mendoza MD   EPINEPHrine (EPIPEN) 0.3 MG/0.3ML solution auto-injector injection  24   Flora Mendoza MD   Glycerin-Polysorbate 80 (REFRESH DRY EYE THERAPY OP) Apply  to eye(s) as directed by provider.    Flora Mendoza MD   levothyroxine (SYNTHROID, LEVOTHROID) 125 MCG tablet Take 1 tablet by mouth Daily. 24   Jason Holden APRN   lisinopril (PRINIVIL,ZESTRIL) 10 MG tablet Take 1 tablet by mouth Daily. 24   Dee Philip PA-C   montelukast (SINGULAIR) 10 MG tablet Take 1 tablet by mouth Every Night.    Flora Mendoza MD   multivitamin (THERAGRAN) tablet tablet Take 1 tablet by mouth Daily.    Flora Mendoza MD   pantoprazole (PROTONIX) 40 MG EC tablet Take 1 tablet by mouth Daily. 24   Layla eDan APRN   pravastatin (PRAVACHOL) 40 MG tablet TAKE 1 TABLET DAILY 24   Jason Holden APRN        Social History:   Social History     Tobacco Use    Smoking status: Former     Current packs/day: 0.00     Average packs/day: 0.3 packs/day for 1.1 years (0.3 ttl pk-yrs)     Types: Cigarettes     Start date: 1973     Quit date: 3/1/1974     Years since quittin.1     Passive exposure: Past    Smokeless tobacco: Never   Vaping Use    Vaping status: Never Used   Substance Use Topics    Alcohol use: Yes     Alcohol/week: 4.0 standard drinks of alcohol     Types: 1 Glasses  "of wine, 3 Cans of beer per week     Comment: occasionally     Drug use: Never         Review of Systems:  Review of Systems   Constitutional:  Negative for chills and fever.   HENT:  Negative for congestion, ear pain and sore throat.    Eyes:  Negative for pain.   Respiratory:  Negative for cough, chest tightness and shortness of breath.    Cardiovascular:  Positive for chest pain.   Gastrointestinal:  Negative for abdominal pain, diarrhea, nausea and vomiting.   Genitourinary:  Negative for flank pain and hematuria.   Musculoskeletal:  Negative for joint swelling.   Skin:  Negative for pallor.   Neurological:  Negative for seizures and headaches.   All other systems reviewed and are negative.       Physical Exam:  /80   Pulse 62   Temp 97.9 °F (36.6 °C) (Oral)   Resp 20   Ht 170.2 cm (67\")   Wt 83.9 kg (185 lb)   SpO2 97%   BMI 28.98 kg/m²     Physical Exam  Vitals and nursing note reviewed.   Constitutional:       General: She is not in acute distress.     Appearance: Normal appearance. She is not toxic-appearing.   HENT:      Head: Normocephalic and atraumatic.      Jaw: There is normal jaw occlusion.   Eyes:      General: Lids are normal.      Extraocular Movements: Extraocular movements intact.      Conjunctiva/sclera: Conjunctivae normal.      Pupils: Pupils are equal, round, and reactive to light.   Cardiovascular:      Rate and Rhythm: Normal rate and regular rhythm.      Pulses: Normal pulses.      Heart sounds: Normal heart sounds.   Pulmonary:      Effort: Pulmonary effort is normal. No respiratory distress.      Breath sounds: Normal breath sounds. No wheezing or rhonchi.   Abdominal:      General: Abdomen is flat.      Palpations: Abdomen is soft.      Tenderness: There is no abdominal tenderness. There is no guarding or rebound.   Musculoskeletal:         General: Normal range of motion.      Cervical back: Normal range of motion and neck supple.      Right lower leg: No edema.      Left " lower leg: No edema.   Skin:     General: Skin is warm and dry.   Neurological:      Mental Status: She is alert and oriented to person, place, and time. Mental status is at baseline.   Psychiatric:         Mood and Affect: Mood normal.              Medical Decision Making:      Comorbidities that affect care:    Asthma, GERD, hypertension, cholelithiasis, history of cancer    External Notes reviewed:    Previous Clinic Note: Outpatient PCP visit primary hypertension 11/12/2024      The following orders were placed and all results were independently analyzed by me:  Orders Placed This Encounter   Procedures    XR Chest 1 View    [place] Draw    High Sensitivity Troponin T    Comprehensive Metabolic Panel    Lipase    BNP    Magnesium    CBC Auto Differential    High Sensitivity Troponin T 1Hr    Ambulatory Referral to Cardiology    Undress & Gown    Continuous Pulse Oximetry    ECG 12 Lead ED Triage Standing Order; Chest Pain    CBC & Differential    Green Top (Gel)    Lavender Top    Gold Top - SST    Light Blue Top       Medications Given in the Emergency Department:  Medications   aspirin chewable tablet 324 mg (324 mg Oral Given 4/2/25 0137)   famotidine (PEPCID) injection 20 mg (20 mg Intravenous Given 4/2/25 0238)   aluminum-magnesium hydroxide-simethicone (MAALOX MAX) 400-400-40 MG/5ML suspension 15 mL (15 mL Oral Given 4/2/25 0238)        ED Course:    ED Course as of 04/02/25 0706   Wed Apr 02, 2025   0112 My interpretation of EKG: Sinus rhythm 76, no acute ischemia [JS]      ED Course User Index  [JS] Gurpreet Dugan MD       Labs:    Lab Results (last 24 hours)       Procedure Component Value Units Date/Time    High Sensitivity Troponin T [[id number]]  (Normal) Collected: 04/02/25 0043    Specimen: Blood Updated: 04/02/25 0108     HS Troponin T 8 ng/L     Narrative:      High Sensitive Troponin T Reference Range:  <14.0 ng/L- Negative Female for AMI  <22.0 ng/L- Negative Male for AMI  >=14 - Abnormal Female  indicating possible myocardial injury.  >=22 - Abnormal Male indicating possible myocardial injury.   Clinicians would have to utilize clinical acumen, EKG, Troponin, and serial changes to determine if it is an Acute Myocardial Infarction or myocardial injury due to an underlying chronic condition.         CBC & Differential [[id number]]  (Normal) Collected: 04/02/25 0043    Specimen: Blood Updated: 04/02/25 0049    Narrative:      The following orders were created for panel order CBC & Differential.  Procedure                               Abnormality         Status                     ---------                               -----------         ------                     CBC Auto Differential[[id number]]        Normal              Final result                 Please view results for these tests on the individual orders.    Comprehensive Metabolic Panel [[id number]]  (Abnormal) Collected: 04/02/25 0043    Specimen: Blood Updated: 04/02/25 0108     Glucose 116 mg/dL      BUN 14 mg/dL      Creatinine 0.69 mg/dL      Sodium 140 mmol/L      Potassium 3.8 mmol/L      Chloride 104 mmol/L      CO2 25.6 mmol/L      Calcium 9.4 mg/dL      Total Protein 6.9 g/dL      Albumin 3.9 g/dL      ALT (SGPT) 19 U/L      AST (SGOT) 20 U/L      Alkaline Phosphatase 98 U/L      Total Bilirubin 0.2 mg/dL      Globulin 3.0 gm/dL      A/G Ratio 1.3 g/dL      BUN/Creatinine Ratio 20.3     Anion Gap 10.4 mmol/L      eGFR 94.1 mL/min/1.73     Narrative:      GFR Categories in Chronic Kidney Disease (CKD)      GFR Category          GFR (mL/min/1.73)    Interpretation  G1                     90 or greater         Normal or high (1)  G2                      60-89                Mild decrease (1)  G3a                   45-59                Mild to moderate decrease  G3b                   30-44                Moderate to severe decrease  G4                    15-29                Severe decrease  G5                    14 or less           Kidney  failure          (1)In the absence of evidence of kidney disease, neither GFR category G1 or G2 fulfill the criteria for CKD.    eGFR calculation 2021 CKD-EPI creatinine equation, which does not include race as a factor    Lipase [[id number]]  (Normal) Collected: 04/02/25 0043    Specimen: Blood Updated: 04/02/25 0108     Lipase 55 U/L     BNP [[id number]]  (Normal) Collected: 04/02/25 0043    Specimen: Blood Updated: 04/02/25 0107     proBNP <36.0 pg/mL     Narrative:      This assay is used as an aid in the diagnosis of individuals suspected of having heart failure. It can be used as an aid in the diagnosis of acute decompensated heart failure (ADHF) in patients presenting with signs and symptoms of ADHF to the emergency department (ED). In addition, NT-proBNP of <300 pg/mL indicates ADHF is not likely.    Age Range Result Interpretation  NT-proBNP Concentration (pg/mL:      <50             Positive            >450                   Gray                 300-450                    Negative             <300    50-75           Positive            >900                  Gray                300-900                  Negative            <300      >75             Positive            >1800                  Gray                300-1800                  Negative            <300    Magnesium [[id number]]  (Normal) Collected: 04/02/25 0043    Specimen: Blood Updated: 04/02/25 0108     Magnesium 2.0 mg/dL     CBC Auto Differential [[id number]]  (Normal) Collected: 04/02/25 0043    Specimen: Blood Updated: 04/02/25 0049     WBC 6.96 10*3/mm3      RBC 4.43 10*6/mm3      Hemoglobin 13.2 g/dL      Hematocrit 40.8 %      MCV 92.1 fL      MCH 29.8 pg      MCHC 32.4 g/dL      RDW 13.2 %      RDW-SD 44.6 fl      MPV 9.0 fL      Platelets 317 10*3/mm3      Neutrophil % 59.1 %      Lymphocyte % 31.5 %      Monocyte % 6.9 %      Eosinophil % 1.6 %      Basophil % 0.6 %      Immature Grans % 0.3 %      Neutrophils, Absolute 4.12 10*3/mm3       Lymphocytes, Absolute 2.19 10*3/mm3      Monocytes, Absolute 0.48 10*3/mm3      Eosinophils, Absolute 0.11 10*3/mm3      Basophils, Absolute 0.04 10*3/mm3      Immature Grans, Absolute 0.02 10*3/mm3      nRBC 0.0 /100 WBC     High Sensitivity Troponin T 1Hr [[id number]] Collected: 04/02/25 0147    Specimen: Blood Updated: 04/02/25 0209     HS Troponin T <6 ng/L      Troponin T Numeric Delta --     Comment: Unable to calculate.       Narrative:      High Sensitive Troponin T Reference Range:  <14.0 ng/L- Negative Female for AMI  <22.0 ng/L- Negative Male for AMI  >=14 - Abnormal Female indicating possible myocardial injury.  >=22 - Abnormal Male indicating possible myocardial injury.   Clinicians would have to utilize clinical acumen, EKG, Troponin, and serial changes to determine if it is an Acute Myocardial Infarction or myocardial injury due to an underlying chronic condition.                  Imaging:    XR Chest 1 View  Result Date: 4/2/2025  XR CHEST 1 VW Date of Exam: 4/2/2025 12:56 AM EDT Indication: Chest Pain Triage Protocol Comparison: 3/26/2024 Findings: Cardiac and mediastinal contours remain normal. The lungs are clear. Pulmonary vascularity is normal. There is no pneumothorax.     No active disease. Electronically Signed: Wilfredo Jeffery MD  4/2/2025 1:13 AM EDT  Workstation ID: [id number]        Differential Diagnosis and Discussion:    Chest Pain:  Based on the patient's signs and symptoms, I considered aortic dissection, myocardial infaction, pulmonary embolism, cardiac tamponade, pericarditis, pneumothorax, musculoskeletal chest pain and other differential diagnosis as an etiology of the patient's chest pain.     PROCEDURES:    Labs were collected in the emergency department and all labs were reviewed and interpreted by me.  X-ray were performed in the emergency department and all X-ray impressions were independently interpreted by me.  An EKG was performed and the EKG was interpreted by me.    ECG 12  Lead ED Triage Standing Order; Chest Pain   Preliminary Result   HEART RATE=67  bpm   RR Mkpmwwam=698  ms   OK Yloyrusp=027  ms   P Horizontal Axis=22  deg   P Front Axis=3  deg   QRSD Interval=90  ms   QT Qjloshfi=076  ms   XJgQ=240  ms   QRS Axis=7  deg   T Wave Axis=19  deg   - NORMAL ECG -   Sinus rhythm   Date and Time of Study:2025-04-02 00:38:02          Procedures    MDM                     Patient Care Considerations:    CT CHEST: I considered ordering a CT scan of the chest, however does not have features consistent with pulmonary embolism.      Consultants/Shared Management Plan:    None    Social Determinants of Health:    Patient has presented with family members who are responsible, reliable and will ensure follow up care.      Disposition and Care Coordination:    Discharged: The patient is suitable and stable for discharge with no need for consideration of admission.    I have explained the patient´s condition, diagnoses and treatment plan based on the information available to me at this time. I have answered questions and addressed any concerns. The patient has a good  understanding of the patient´s diagnosis, condition, and treatment plan as can be expected at this point. The vital signs have been stable. The patient´s condition is stable and appropriate for discharge from the emergency department.      The patient will pursue further outpatient evaluation with the primary care physician or other designated or consulting physician as outlined in the discharge instructions. They are agreeable to this plan of care and follow-up instructions have been explained in detail. The patient has received these instructions in written format and has expressed an understanding of the discharge instructions. The patient is aware that any significant change in condition or worsening of symptoms should prompt an immediate return to this or the closest emergency department or call to 911.    Final diagnoses:    Chest pain, unspecified type        ED Disposition       ED Disposition   Discharge    Condition   Stable    Comment   --               This medical record created using voice recognition software.             Gurpreet Dugan MD  04/02/25 0769

## 2025-04-02 NOTE — TELEPHONE ENCOUNTER
Caller: Mony Echeverria    Relationship to patient: Self    Best call back number: [phone]     Chief complaint: CHEST PAIN    Type of visit: HOSPITAL FOLLOW UP    Requested date: ASAP     If rescheduling, when is the original appointment:      Additional notes: PATIENT D/C FROM [facility] ER 4/2/25 FOR CHEST PAIN    UNABLE TO WARM TRANSFER    PLEASE CALL AND ADVISE

## 2025-04-03 ENCOUNTER — OFFICE VISIT (OUTPATIENT)
Dept: INTERNAL MEDICINE | Facility: CLINIC | Age: 70
End: 2025-04-03
Payer: MEDICARE

## 2025-04-03 VITALS
HEIGHT: 67 IN | BODY MASS INDEX: 29.58 KG/M2 | TEMPERATURE: 97.3 F | SYSTOLIC BLOOD PRESSURE: 128 MMHG | DIASTOLIC BLOOD PRESSURE: 80 MMHG | OXYGEN SATURATION: 96 % | RESPIRATION RATE: 16 BRPM | WEIGHT: 188.5 LBS | HEART RATE: 63 BPM

## 2025-04-03 DIAGNOSIS — R07.9 CHEST PAIN, UNSPECIFIED TYPE: Primary | ICD-10-CM

## 2025-04-03 PROCEDURE — 3074F SYST BP LT 130 MM HG: CPT | Performed by: INTERNAL MEDICINE

## 2025-04-03 PROCEDURE — 99212 OFFICE O/P EST SF 10 MIN: CPT | Performed by: INTERNAL MEDICINE

## 2025-04-03 PROCEDURE — 3079F DIAST BP 80-89 MM HG: CPT | Performed by: INTERNAL MEDICINE

## 2025-04-22 ENCOUNTER — TELEPHONE (OUTPATIENT)
Dept: GASTROENTEROLOGY | Facility: CLINIC | Age: 70
End: 2025-04-22
Payer: MEDICARE

## 2025-04-22 ENCOUNTER — TELEPHONE (OUTPATIENT)
Dept: INTERNAL MEDICINE | Facility: CLINIC | Age: 70
End: 2025-04-22
Payer: MEDICARE

## 2025-04-22 NOTE — TELEPHONE ENCOUNTER
Patient called into the office today to see if she is allowed to take pantoprazole and pepcid. She was prescribed pepcid 20mg 2 capsules at bedtime when seen in the ER. I advised patient it is okay to take both medications.   Patient is requesting a refill on pepcid.     Refills pended to encounter for review.

## 2025-04-24 RX ORDER — FAMOTIDINE 20 MG/1
40 TABLET, FILM COATED ORAL
Qty: 180 TABLET | Refills: 0 | Status: SHIPPED | OUTPATIENT
Start: 2025-04-24

## 2025-04-28 ENCOUNTER — OFFICE VISIT (OUTPATIENT)
Dept: CARDIOLOGY | Facility: CLINIC | Age: 70
End: 2025-04-28
Payer: MEDICARE

## 2025-04-28 VITALS
HEIGHT: 67 IN | HEART RATE: 64 BPM | SYSTOLIC BLOOD PRESSURE: 150 MMHG | WEIGHT: 189 LBS | DIASTOLIC BLOOD PRESSURE: 80 MMHG | BODY MASS INDEX: 29.66 KG/M2

## 2025-04-28 DIAGNOSIS — I10 PRIMARY HYPERTENSION: Primary | ICD-10-CM

## 2025-04-28 DIAGNOSIS — R07.9 CHEST PAIN, UNSPECIFIED TYPE: ICD-10-CM

## 2025-04-28 DIAGNOSIS — E78.2 MIXED HYPERLIPIDEMIA: ICD-10-CM

## 2025-04-28 PROCEDURE — 99204 OFFICE O/P NEW MOD 45 MIN: CPT | Performed by: INTERNAL MEDICINE

## 2025-04-28 PROCEDURE — 3077F SYST BP >= 140 MM HG: CPT | Performed by: INTERNAL MEDICINE

## 2025-04-28 PROCEDURE — 3079F DIAST BP 80-89 MM HG: CPT | Performed by: INTERNAL MEDICINE

## 2025-04-28 NOTE — PROGRESS NOTES
Chief Complaint  Chest Pain    Subjective        Mony Echeverria presents to [facility] CARDIOLOGY  History of present illness:    Patient is a 69-year-old female who presented to the emergency department a few weeks ago.  She was awoken in the middle of the night with a constant burning/fire sensation in her chest that lasted an hour and 1/2 to 2 hours constantly.  They did give her Pepcid IV which did improve it.  She noted no real nausea/vomiting or burping.  She has not had similar symptoms.  She is active golfing 3 days/week and when she is walking around she notes no chest pain.  She states her blood pressure has been running normal.  She does not smoke.  She notes occasional alcohol.  Mother had history of CVA and father  of complications from bladder cancer      Past Medical History:   Diagnosis Date    Allergic     Asthma     Cancer     Cataract     Cholelithiasis     Colon polyp     Disease of thyroid gland     GERD (gastroesophageal reflux disease)     Heart murmur     Hyperlipidemia     Hypertension     Hypothyroidism     Kidney stone          Past Surgical History:   Procedure Laterality Date    APPENDECTOMY      CATARACT EXTRACTION       SECTION      CHOLECYSTECTOMY      COLONOSCOPY      ENDOSCOPY N/A 4/3/2024    Procedure: ESOPHAGOGASTRODUODENOSCOPY WITH BIOPSIES, POLYPECTOMY, BALLOON DILATATION 15-18;  Surgeon: Crispin White MD;  Location: [facility] ENDOSCOPY;  Service: Gastroenterology;  Laterality: N/A;  HIATAL HERNIA, GASTRIC BODY POLYP, EROSIVE GASTRITIS, SCHATZKIS RING    EXPLORATORY LAPAROTOMY      HYSTERECTOMY      UPPER GASTROINTESTINAL ENDOSCOPY            Social History     Socioeconomic History    Marital status:    Tobacco Use    Smoking status: Former     Current packs/day: 0.00     Average packs/day: 0.3 packs/day for 1.2 years (0.3 ttl pk-yrs)     Types: Cigarettes     Start date: 1973     Quit date: 3/1/1974     Years since quitting:  51.1     Passive exposure: Past    Smokeless tobacco: Never   Vaping Use    Vaping status: Never Used   Substance and Sexual Activity    Alcohol use: Yes     Alcohol/week: 4.0 standard drinks of alcohol     Types: 1 Glasses of wine, 3 Cans of beer per week     Comment: occasionally     Drug use: Never    Sexual activity: Yes     Partners: Male     Birth control/protection: Post-menopausal         Family History   Problem Relation Age of Onset    Diabetes Mother         Became diabetic in her fifties    Mental illness Father         Manic - Bipolar    Diabetes Sister         Became in fifties    Diabetes Sister         Became in fifties    Diabetes Brother         Became in fifties    Thyroid disease Sister         In her thirties, also siblings listed above.    Colon cancer Neg Hx           Allergies   Allergen Reactions    Seasonal Ic [Octacosanol] Unknown - Low Severity    Cephalexin Rash    Latex Rash    Sulfa Antibiotics Rash            Current Outpatient Medications:     albuterol sulfate  (90 Base) MCG/ACT inhaler, Ventolin HFA 90 mcg/actuation inhalation HFA aerosol inhaler inhale 1 - 2 puffs by inhalation route every 4-6 hours as needed   Suspended, Disp: , Rfl:     Arnuity Ellipta 200 MCG/ACT, , Disp: , Rfl:     aspirin 81 MG chewable tablet, Chew 1 tablet Daily., Disp: , Rfl:     EPINEPHrine (EPIPEN) 0.3 MG/0.3ML solution auto-injector injection, , Disp: , Rfl:     famotidine (Pepcid) 20 MG tablet, Take 2 tablets by mouth every night at bedtime., Disp: 180 tablet, Rfl: 0    Glycerin-Polysorbate 80 (REFRESH DRY EYE THERAPY OP), Apply  to eye(s) as directed by provider., Disp: , Rfl:     levothyroxine (SYNTHROID, LEVOTHROID) 125 MCG tablet, Take 1 tablet by mouth Daily., Disp: 90 tablet, Rfl: 1    lisinopril (PRINIVIL,ZESTRIL) 10 MG tablet, Take 1 tablet by mouth Daily., Disp: 90 tablet, Rfl: 1    montelukast (SINGULAIR) 10 MG tablet, Take 1 tablet by mouth Every Night., Disp: , Rfl:     multivitamin  "(THERAGRAN) tablet tablet, Take 1 tablet by mouth Daily., Disp: , Rfl:     pantoprazole (PROTONIX) 40 MG EC tablet, Take 1 tablet by mouth Daily., Disp: 90 tablet, Rfl: 2    pravastatin (PRAVACHOL) 40 MG tablet, TAKE 1 TABLET DAILY, Disp: 90 tablet, Rfl: 3      ROS:  Cardiac review of systems positive for atypical chest pain.    Objective     /80   Pulse 64   Ht 170.2 cm (67\")   Wt 85.7 kg (189 lb)   BMI 29.60 kg/m²       General Appearance:   well developed  well nourished  HENT:   oropharynx moist  lips not cyanotic  Respiratory:  no respiratory distress  normal breath sounds  no rales  Cardiovascular:  no jugular venous distention  regular rhythm  S1 normal, S2 normal  no S3, no S4   no murmur  no rub, no thrill  No carotid bruit  pedal pulses normal  lower extremity edema: none    Musculoskeletal:  no clubbing of fingers.   normocephalic, head atraumatic  Skin:   warm, dry  Psychiatric:  judgement and insight appropriate  normal mood and affect    ECHO:    STRESS:    CATH:  No results found for this or any previous visit.    BMP:     Glucose   Date Value Ref Range Status   04/02/2025 116 (H) 65 - 99 mg/dL Final     BUN   Date Value Ref Range Status   04/02/2025 14 8 - 23 mg/dL Final     Creatinine   Date Value Ref Range Status   04/02/2025 0.69 0.57 - 1.00 mg/dL Final     Sodium   Date Value Ref Range Status   04/02/2025 140 136 - 145 mmol/L Final     Potassium   Date Value Ref Range Status   04/02/2025 3.8 3.5 - 5.2 mmol/L Final     Chloride   Date Value Ref Range Status   04/02/2025 104 98 - 107 mmol/L Final     CO2   Date Value Ref Range Status   04/02/2025 25.6 22.0 - 29.0 mmol/L Final     Calcium   Date Value Ref Range Status   04/02/2025 9.4 8.6 - 10.5 mg/dL Final     BUN/Creatinine Ratio   Date Value Ref Range Status   04/02/2025 20.3 7.0 - 25.0 Final     Anion Gap   Date Value Ref Range Status   04/02/2025 10.4 5.0 - 15.0 mmol/L Final     eGFR   Date Value Ref Range Status   04/02/2025 94.1 " >60.0 mL/min/1.73 Final     LIPIDS:  Total Cholesterol   Date Value Ref Range Status   07/16/2024 198 0 - 200 mg/dL Final     Triglycerides   Date Value Ref Range Status   07/16/2024 84 0 - 150 mg/dL Final     HDL Cholesterol   Date Value Ref Range Status   07/16/2024 73 (H) 40 - 60 mg/dL Final     LDL Cholesterol    Date Value Ref Range Status   07/16/2024 110 (H) 0 - 100 mg/dL Final     VLDL Cholesterol   Date Value Ref Range Status   07/16/2024 15 5 - 40 mg/dL Final     LDL/HDL Ratio   Date Value Ref Range Status   07/16/2024 1.48  Final         Procedures             ASSESSMENT:  Diagnoses and all orders for this visit:    1. Primary hypertension (Primary)    2. Chest pain, unspecified type    3. Mixed hyperlipidemia         PLAN:    1.  Reviewed patient's emergency department visit and she has had 2 sets of negative troponins.  Her EKG showed normal sinus rhythm.  I do think this completely rules out a cardiac source of the pain that awoke her from sleep and lasted constant for an hour and 1/2 to 2 hours.  2.  Patient walks around [place] golf [place] 3 days/week with no chest pain.  I did talk to her about a regular exercise program walking 30 minutes 5 days a week and calling us if any exertional chest pain relieved with rest.  3.  Patient had echocardiogram 3/1/2019 that showed normal ejection fraction and no significant valvular disease.  4.  Patient does not smoke.  5.  Patient's blood pressure here is a little bit elevated but last time was perfect.  She is just going to continue to monitor it.  6.  Continue the pravastatin.  Patient cholesterol checked 7/16/2024 with , HDL 73, and triglycerides 84.  These are under good control.  7.  I do think we can just see the patient back as needed.  Again she is going to call us if any exertional chest pain relieved with rest.      Return if symptoms worsen or fail to improve.     Patient was given instructions and counseling regarding her condition or for health  maintenance advice. Please see specific information pulled into the AVS if appropriate.         Hiram Tolliver MD   4/28/2025  12:00 EDT

## 2025-05-05 ENCOUNTER — HOSPITAL ENCOUNTER (OUTPATIENT)
Dept: MAMMOGRAPHY | Facility: HOSPITAL | Age: 70
Discharge: HOME | End: 2025-05-05
Admitting: NURSE PRACTITIONER
Payer: MEDICARE

## 2025-05-05 DIAGNOSIS — Z12.31 SCREENING MAMMOGRAM FOR BREAST CANCER: ICD-10-CM

## 2025-05-05 PROCEDURE — 77063 BREAST TOMOSYNTHESIS BI: CPT

## 2025-05-05 PROCEDURE — 77067 SCR MAMMO BI INCL CAD: CPT

## 2025-05-07 NOTE — PROGRESS NOTES
"Chief Complaint  Follow-up (ED follow up from 4/2/25 for chest pain, states no chest pains today and no other symptoms )    Subjective      Mony Echeverria is a 69 y.o. female who presents to [facility]     Presenting for follow-up from a ER visit on 2/2/2025.  She was seen for chest pain and had a negative workup.  She states that she is not having any chest pain today and denies other symptoms.    Objective   Vital Signs:   Vitals:    04/03/25 1432   BP: 128/80   BP Location: Left arm   Patient Position: Sitting   Cuff Size: Adult   Pulse: 63   Resp: 16   Temp: 97.3 °F (36.3 °C)   TempSrc: Temporal   SpO2: 96%   Weight: 85.5 kg (188 lb 8 oz)   Height: 170.2 cm (67\")     Body mass index is 29.52 kg/m².    Wt Readings from Last 3 Encounters:   04/28/25 85.7 kg (189 lb)   04/03/25 85.5 kg (188 lb 8 oz)   04/02/25 83.9 kg (185 lb)     BP Readings from Last 3 Encounters:   04/28/25 150/80   04/03/25 128/80   04/02/25 173/80       Health Maintenance   Topic Date Due    COVID-19 Vaccine (8 - 2024-25 season) 03/23/2025    COLORECTAL CANCER SCREENING  04/08/2025    ANNUAL WELLNESS VISIT  07/16/2025    INFLUENZA VACCINE  07/01/2025    LIPID PANEL  07/16/2025    DXA SCAN  05/03/2026    MAMMOGRAM  05/05/2027    TDAP/TD VACCINES (3 - Td or Tdap) 05/10/2033    Pneumococcal Vaccine 50+  Completed    ZOSTER VACCINE  Completed    HEPATITIS C SCREENING  Discontinued       Physical Exam  Vitals reviewed.   Constitutional:       Appearance: Normal appearance. She is well-developed.   HENT:      Head: Normocephalic and atraumatic.      Mouth/Throat:      Pharynx: No oropharyngeal exudate.   Eyes:      Conjunctiva/sclera: Conjunctivae normal.      Pupils: Pupils are equal, round, and reactive to light.   Neck:      Thyroid: No thyromegaly or thyroid tenderness.   Cardiovascular:      Rate and Rhythm: Normal rate and regular rhythm.      Heart sounds: No murmur heard.     No friction " rub. No gallop.   Pulmonary:      Effort: Pulmonary effort is normal.      Breath sounds: Normal breath sounds. No wheezing or rhonchi.   Lymphadenopathy:      Cervical: No cervical adenopathy.   Skin:     General: Skin is warm and dry.   Neurological:      Mental Status: She is alert and oriented to person, place, and time.   Psychiatric:         Mood and Affect: Affect normal.          Result Review :  The following data was reviewed by: Mercy Woods MD on 04/03/2025:  CMP          7/16/2024    13:28 4/2/2025    00:43   CMP   Glucose 90  116    BUN 17  14    Creatinine 0.74  0.69    EGFR 88.3  94.1    Sodium 142  140    Potassium 4.1  3.8    Chloride 103  104    Calcium 9.9  9.4    Total Protein 7.5  6.9    Albumin 4.5  3.9    Globulin 3.0  3.0    Total Bilirubin 0.2  0.2    Alkaline Phosphatase 83  98    AST (SGOT) 28  20    ALT (SGPT) 22  19    Albumin/Globulin Ratio 1.5  1.3    BUN/Creatinine Ratio 23.0  20.3    Anion Gap 11.2  10.4      CBC w/diff          7/16/2024    13:28 4/2/2025    00:43   CBC w/Diff   WBC 7.09  6.96    RBC 4.45  4.43    Hemoglobin 13.1  13.2    Hematocrit 39.6  40.8    MCV 89.0  92.1    MCH 29.4  29.8    MCHC 33.1  32.4    RDW 12.7  13.2    Platelets 324  317    Neutrophil Rel % 58.9  59.1    Immature Granulocyte Rel % 0.3  0.3    Lymphocyte Rel % 32.3  31.5    Monocyte Rel % 6.8  6.9    Eosinophil Rel % 1.1  1.6    Basophil Rel % 0.6  0.6      Lipid Panel          7/16/2024    13:28   Lipid Panel   Total Cholesterol 198    Triglycerides 84    HDL Cholesterol 73    VLDL Cholesterol 15    LDL Cholesterol  110    LDL/HDL Ratio 1.48      TSH          7/16/2024    13:28   TSH   TSH 1.130             Procedures          Assessment & Plan  Chest pain, unspecified type  Now resolved.  Denies any other symptoms.                      FOLLOW UP  No follow-ups on file.  Patient was given instructions and counseling regarding her condition or for health maintenance advice. Please see  specific information pulled into the AVS if appropriate.       Mercy Woods MD  05/07/25  16:53 EDT    CURRENT & DISCONTINUED MEDICATIONS  Current Outpatient Medications   Medication Instructions    albuterol sulfate  (90 Base) MCG/ACT inhaler Ventolin HFA 90 mcg/actuation inhalation HFA aerosol inhaler inhale 1 - 2 puffs by inhalation route every 4-6 hours as needed   Suspended    Arnuity Ellipta 200 MCG/ACT     aspirin 81 mg, Daily    EPINEPHrine (EPIPEN) 0.3 MG/0.3ML solution auto-injector injection     famotidine (PEPCID) 40 mg, Oral, Every Night at Bedtime    Glycerin-Polysorbate 80 (REFRESH DRY EYE THERAPY OP) Apply  to eye(s) as directed by provider.    levothyroxine (SYNTHROID, LEVOTHROID) 125 mcg, Oral, Daily    lisinopril (PRINIVIL,ZESTRIL) 10 mg, Oral, Daily    montelukast (SINGULAIR) 10 mg, Nightly    multivitamin (THERAGRAN) tablet tablet 1 tablet, Daily    pantoprazole (PROTONIX) 40 mg, Oral, Daily    pravastatin (PRAVACHOL) 40 mg, Oral, Daily       There are no discontinued medications.

## 2025-05-13 DIAGNOSIS — I10 PRIMARY HYPERTENSION: ICD-10-CM

## 2025-05-13 RX ORDER — LISINOPRIL 10 MG/1
10 TABLET ORAL DAILY
Qty: 90 TABLET | Refills: 3 | Status: SHIPPED | OUTPATIENT
Start: 2025-05-13

## 2025-05-29 ENCOUNTER — HOSPITAL ENCOUNTER (OUTPATIENT)
Dept: ULTRASOUND IMAGING | Facility: HOSPITAL | Age: 70
Discharge: HOME | End: 2025-05-29
Payer: MEDICARE

## 2025-05-29 ENCOUNTER — OFFICE VISIT (OUTPATIENT)
Dept: INTERNAL MEDICINE | Facility: CLINIC | Age: 70
End: 2025-05-29
Payer: MEDICARE

## 2025-05-29 ENCOUNTER — HOSPITAL ENCOUNTER (OUTPATIENT)
Dept: MAMMOGRAPHY | Facility: HOSPITAL | Age: 70
Discharge: HOME | End: 2025-05-29
Payer: MEDICARE

## 2025-05-29 VITALS
RESPIRATION RATE: 12 BRPM | SYSTOLIC BLOOD PRESSURE: 136 MMHG | OXYGEN SATURATION: 99 % | BODY MASS INDEX: 29.82 KG/M2 | HEIGHT: 67 IN | HEART RATE: 52 BPM | DIASTOLIC BLOOD PRESSURE: 80 MMHG | TEMPERATURE: 97.1 F | WEIGHT: 190 LBS

## 2025-05-29 DIAGNOSIS — R92.8 ABNORMALITY OF RIGHT BREAST ON SCREENING MAMMOGRAM: ICD-10-CM

## 2025-05-29 DIAGNOSIS — E03.9 HYPOTHYROIDISM, UNSPECIFIED TYPE: Primary | ICD-10-CM

## 2025-05-29 DIAGNOSIS — R53.83 FATIGUE, UNSPECIFIED TYPE: ICD-10-CM

## 2025-05-29 LAB
FOLATE SERPL-MCNC: >20 NG/ML (ref 4.78–24.2)
T3FREE SERPL-MCNC: 2.53 PG/ML (ref 2–4.4)
T4 FREE SERPL-MCNC: 1.24 NG/DL (ref 0.92–1.68)
TSH SERPL DL<=0.05 MIU/L-ACNC: 2.65 UIU/ML (ref 0.27–4.2)
VIT B12 BLD-MCNC: 543 PG/ML (ref 211–946)

## 2025-05-29 PROCEDURE — 76642 ULTRASOUND BREAST LIMITED: CPT

## 2025-05-29 PROCEDURE — 84443 ASSAY THYROID STIM HORMONE: CPT | Performed by: NURSE PRACTITIONER

## 2025-05-29 PROCEDURE — 82746 ASSAY OF FOLIC ACID SERUM: CPT | Performed by: NURSE PRACTITIONER

## 2025-05-29 PROCEDURE — 77065 DX MAMMO INCL CAD UNI: CPT

## 2025-05-29 PROCEDURE — 84481 FREE ASSAY (FT-3): CPT | Performed by: NURSE PRACTITIONER

## 2025-05-29 PROCEDURE — 82607 VITAMIN B-12: CPT | Performed by: NURSE PRACTITIONER

## 2025-05-29 PROCEDURE — 84439 ASSAY OF FREE THYROXINE: CPT | Performed by: NURSE PRACTITIONER

## 2025-05-29 PROCEDURE — G0279 TOMOSYNTHESIS, MAMMO: HCPCS

## 2025-05-29 NOTE — PROGRESS NOTES
"Chief Complaint  Fatigue, Weight Gain, Skin feels cold, Hair and nails (Hair and nails are not growing ), and Bowels (Patient stated that bowels are moving very slowly )    Subjective        Mony Echeverria presents to [facility] for thyroid concerns.  Patient reports noticing more recently more fatigue, weight gain, skin feeling cold, hair and nail growth not as good.  She is concerned regarding her thyroid, last checked in July 2024.  She has not had any dosage adjustments in several years.    Objective   Vital Signs:   /80 (BP Location: Left arm, Patient Position: Sitting, Cuff Size: Adult)   Pulse 52   Temp 97.1 °F (36.2 °C) (Temporal)   Resp 12   Ht 170.2 cm (67.01\")   Wt 86.2 kg (190 lb)   SpO2 99%   BMI 29.75 kg/m²     Physical Exam  Vitals and nursing note reviewed.   Constitutional:       Appearance: Normal appearance.   Cardiovascular:      Rate and Rhythm: Normal rate.   Pulmonary:      Effort: Pulmonary effort is normal.   Neurological:      Mental Status: She is alert.   Psychiatric:         Mood and Affect: Mood normal.         Thought Content: Thought content normal.        Result Review :  {The following data was reviewed by RIAN Lindquist on 05/29/25                Diagnoses and all orders for this visit:    1. Hypothyroidism, unspecified type (Primary)  -     TSH  -     T4, Free  -     T3, Free    2. Fatigue, unspecified type  -     TSH  -     T4, Free  -     T3, Free  -     Vitamin B12 & Folate    Patient with concerns that her thyroid levels may be off, she has been on levothyroxine for years, no dosage adjustments have been required in quite some time.  Will check levels today including B12 and folate, will advise further based on results.      Follow Up   No follow-ups on file.  Patient was given instructions and counseling regarding her condition or for health maintenance advice. Please see specific information pulled into the AVS if appropriate. "     Jason Holden, RIAN  5/29/2025  This note was electronically signed.

## 2025-06-02 RX ORDER — LEVOTHYROXINE SODIUM 125 UG/1
125 TABLET ORAL DAILY
Qty: 90 TABLET | Refills: 3 | Status: SHIPPED | OUTPATIENT
Start: 2025-06-02

## 2025-06-12 ENCOUNTER — OFFICE VISIT (OUTPATIENT)
Dept: INTERNAL MEDICINE | Facility: CLINIC | Age: 70
End: 2025-06-12
Payer: MEDICARE

## 2025-06-12 VITALS
DIASTOLIC BLOOD PRESSURE: 78 MMHG | WEIGHT: 190.2 LBS | HEART RATE: 68 BPM | SYSTOLIC BLOOD PRESSURE: 138 MMHG | BODY MASS INDEX: 29.85 KG/M2 | TEMPERATURE: 97.7 F | HEIGHT: 67 IN | RESPIRATION RATE: 16 BRPM | OXYGEN SATURATION: 98 %

## 2025-06-12 DIAGNOSIS — E55.9 VITAMIN D DEFICIENCY: ICD-10-CM

## 2025-06-12 DIAGNOSIS — E66.3 OVERWEIGHT WITH BODY MASS INDEX (BMI) OF 25 TO 25.9 IN ADULT: ICD-10-CM

## 2025-06-12 DIAGNOSIS — E03.9 HYPOTHYROIDISM, UNSPECIFIED TYPE: ICD-10-CM

## 2025-06-12 DIAGNOSIS — J30.2 SEASONAL ALLERGIC RHINITIS, UNSPECIFIED TRIGGER: ICD-10-CM

## 2025-06-12 DIAGNOSIS — K21.9 GASTROESOPHAGEAL REFLUX DISEASE, UNSPECIFIED WHETHER ESOPHAGITIS PRESENT: ICD-10-CM

## 2025-06-12 DIAGNOSIS — I10 PRIMARY HYPERTENSION: Primary | ICD-10-CM

## 2025-06-12 DIAGNOSIS — R73.01 IMPAIRED FASTING GLUCOSE: ICD-10-CM

## 2025-06-12 DIAGNOSIS — K21.9 GASTROESOPHAGEAL REFLUX DISEASE WITHOUT ESOPHAGITIS: ICD-10-CM

## 2025-06-12 DIAGNOSIS — E78.49 OTHER HYPERLIPIDEMIA: ICD-10-CM

## 2025-06-12 LAB
25(OH)D3 SERPL-MCNC: 38.3 NG/ML (ref 30–100)
ALBUMIN SERPL-MCNC: 4.3 G/DL (ref 3.5–5.2)
ALBUMIN/GLOB SERPL: 1.5 G/DL
ALP SERPL-CCNC: 95 U/L (ref 39–117)
ALT SERPL W P-5'-P-CCNC: 23 U/L (ref 1–33)
ANION GAP SERPL CALCULATED.3IONS-SCNC: 10.9 MMOL/L (ref 5–15)
AST SERPL-CCNC: 28 U/L (ref 1–32)
BILIRUB SERPL-MCNC: 0.3 MG/DL (ref 0–1.2)
BUN SERPL-MCNC: 12 MG/DL (ref 8–23)
BUN/CREAT SERPL: 14.5 (ref 7–25)
CALCIUM SPEC-SCNC: 10 MG/DL (ref 8.6–10.5)
CHLORIDE SERPL-SCNC: 105 MMOL/L (ref 98–107)
CHOLEST SERPL-MCNC: 185 MG/DL (ref 0–200)
CO2 SERPL-SCNC: 27.1 MMOL/L (ref 22–29)
CREAT SERPL-MCNC: 0.83 MG/DL (ref 0.57–1)
EGFRCR SERPLBLD CKD-EPI 2021: 76.4 ML/MIN/1.73
GLOBULIN UR ELPH-MCNC: 2.8 GM/DL
GLUCOSE SERPL-MCNC: 93 MG/DL (ref 65–99)
HBA1C MFR BLD: 6 % (ref 4.8–5.6)
HDLC SERPL-MCNC: 60 MG/DL (ref 40–60)
LDLC SERPL CALC-MCNC: 105 MG/DL (ref 0–100)
LDLC/HDLC SERPL: 1.7 {RATIO}
POTASSIUM SERPL-SCNC: 4.6 MMOL/L (ref 3.5–5.2)
PROT SERPL-MCNC: 7.1 G/DL (ref 6–8.5)
SODIUM SERPL-SCNC: 143 MMOL/L (ref 136–145)
TRIGL SERPL-MCNC: 115 MG/DL (ref 0–150)
VLDLC SERPL-MCNC: 20 MG/DL (ref 5–40)

## 2025-06-12 PROCEDURE — 82306 VITAMIN D 25 HYDROXY: CPT | Performed by: NURSE PRACTITIONER

## 2025-06-12 PROCEDURE — 83036 HEMOGLOBIN GLYCOSYLATED A1C: CPT | Performed by: NURSE PRACTITIONER

## 2025-06-12 PROCEDURE — 80061 LIPID PANEL: CPT | Performed by: NURSE PRACTITIONER

## 2025-06-12 PROCEDURE — 80053 COMPREHEN METABOLIC PANEL: CPT | Performed by: NURSE PRACTITIONER

## 2025-06-12 RX ORDER — PANTOPRAZOLE SODIUM 40 MG/1
40 TABLET, DELAYED RELEASE ORAL DAILY
Qty: 90 TABLET | Refills: 2 | Status: SHIPPED | OUTPATIENT
Start: 2025-06-12

## 2025-06-12 RX ORDER — FAMOTIDINE 20 MG/1
40 TABLET, FILM COATED ORAL
Qty: 180 TABLET | Refills: 0 | Status: SHIPPED | OUTPATIENT
Start: 2025-06-12

## 2025-06-12 NOTE — PROGRESS NOTES
"Chief Complaint  Hypothyroidism (Would like to check other labs besides thyroid. ) and Fall (Missed a step and fell down one step, hit bottom and has a bruise on lower back by tailbone either. )    Subjective        Mony Echeverria presents to [facility] for 6-month follow-up for chronic conditions.    Overall, patient reports doing pretty well.  Staying active, especially on the golf course.  Blood pressure has been well-controlled, due for metabolic panel.  Due for cholesterol check.  She has had mildly elevated glucose the last few times, we will check A1c.  Also vitamin D.    Objective   Vital Signs:   /78 (BP Location: Left arm, Patient Position: Sitting, Cuff Size: Adult)   Pulse 68   Temp 97.7 °F (36.5 °C) (Temporal)   Resp 16   Ht 170.2 cm (67\")   Wt 86.3 kg (190 lb 3.2 oz)   SpO2 98%   BMI 29.79 kg/m²     Physical Exam  Vitals and nursing note reviewed.   Constitutional:       Appearance: Normal appearance.   HENT:      Right Ear: External ear normal.      Left Ear: External ear normal.   Cardiovascular:      Rate and Rhythm: Normal rate.   Pulmonary:      Effort: Pulmonary effort is normal.   Neurological:      Mental Status: She is alert.   Psychiatric:         Mood and Affect: Mood normal.        Result Review :  {The following data was reviewed by RIAN Lindquist on 06/12/25                Diagnoses and all orders for this visit:    1. Primary hypertension (Primary)  -     Comprehensive Metabolic Panel    2. Hypothyroidism, unspecified type    3. Seasonal allergic rhinitis, unspecified trigger    4. Other hyperlipidemia  -     Lipid Panel    5. Overweight with body mass index (BMI) of 25 to 25.9 in adult    6. Gastroesophageal reflux disease without esophagitis    7. Impaired fasting glucose  -     Comprehensive Metabolic Panel  -     Hemoglobin A1c    8. Vitamin D deficiency  -     Vitamin D,25-Hydroxy    Labs today including metabolic panel, A1c, vitamin " D, we will follow-up with results and recommendations.  Patient does take multivitamin, will ensure she is getting enough vitamin D, especially being postmenopausal.  Otherwise, blood pressure is doing well, she has adequate supply of her medications, will check cholesterol levels to ensure good trends.  Continue with regular exercise and activity, healthy eating.  Follow-up 6 months.      Follow Up   Return in about 6 months (around 12/12/2025) for Medicare Wellness, Recheck.  Patient was given instructions and counseling regarding her condition or for health maintenance advice. Please see specific information pulled into the AVS if appropriate.     Jason Holden, RIAN  6/12/2025  This note was electronically signed.

## 2025-06-12 NOTE — TELEPHONE ENCOUNTER
Medication Requested Pantoprazole 40mg and Famotidine 20mg    Last Refill 11/26/2024, 4/24/2025    Last OV 9/10/2024    Next OV 11/25/2025    Medication pended for approval and correct pharmacy verified Yes

## (undated) DEVICE — Device

## (undated) DEVICE — SOL IRRG H2O PL/BG 1000ML STRL

## (undated) DEVICE — CONN JET HYDRA H20 AUXILIARY DISP

## (undated) DEVICE — Device: Brand: DEFENDO AIR/WATER/SUCTION AND BIOPSY VALVE

## (undated) DEVICE — BLCK/BITE BLOX WO/DENTL/RIM W/STRAP 54F

## (undated) DEVICE — ESOPHAGEAL BALLOON DILATATION CATHETER: Brand: CRE FIXED WIRE

## (undated) DEVICE — DEV INFL CRE STERIFLATE 60CC DISP

## (undated) DEVICE — SOLIDIFIER LIQLOC PLS 1500CC BT

## (undated) DEVICE — LINER SURG CANSTR SXN S/RIGD 1500CC